# Patient Record
Sex: MALE
[De-identification: names, ages, dates, MRNs, and addresses within clinical notes are randomized per-mention and may not be internally consistent; named-entity substitution may affect disease eponyms.]

---

## 2020-03-18 NOTE — DIAGNOSTIC IMAGING REPORT
EXAMINATION:  CHEST 2 VIEWS    



INDICATION: Pain.



COMPARISON:  None

     

FINDINGS:

TUBES and LINES:  None.



LUNGS:  Lungs are well inflated.  Mild perihilar, peribronchial thickening and

perihilar streaky densities may reflect viral infection versus reactive airway

disease.   Bibasilar atelectasis.



PLEURA:  No pleural effusion or pneumothorax.



HEART AND MEDIASTINUM:  The cardiomediastinal silhouette is unremarkable.    



BONES AND SOFT TISSUES:  No acute osseous lesion.  Soft tissues are

unremarkable.



UPPER ABDOMEN: No free air under the diaphragm.    



IMPRESSION: 

Mild bilateral perihilar, peribronchial thickening. No focal consolidations.





Signed by: Dr. SABAS James M.D. on 3/18/2020 10:33 PM

## 2020-03-18 NOTE — DIAGNOSTIC IMAGING REPORT
EXAM: CT Abdomen and Pelvis WITH contrast  

INDICATION: Abdominal pain, nausea, vomiting diarrhea. Fever and chills.

COMPARISON: None.

TECHNIQUE: Abdomen and pelvis were scanned utilizing a multidetector helical

scanner from the lung base to the pubic symphysis after administration of IV

contrast. Coronal and sagittal reformations were obtained. Routine protocol was

performed. Scan was performed when during portal venous phase.

            IV CONTRAST: 100 cc Isovue-370

            ORAL CONTRAST: Water

            RADIATION DOSE: Total DLP: 718.03 mGy*cm

             Estimated effective dose: (DLP x 0.015 x size factor) mSv

            COMPLICATIONS: None



FINDINGS:



LINES and TUBES: None.



LOWER THORAX:  There is bibasilar atelectasis.



HEPATOBILIARY: The liver is diffuse hypodense compared to the spleen,

consistent with diffuse hepatic diffuse hepatic steatosis.  No focal hepatic

lesions. No biliary ductal dilation. 



GALLBLADDER: No radio-opaque stones or sludge.  No wall thickening.



SPLEEN: No splenomegaly. 



PANCREAS: No focal masses or ductal dilatation.  



ADRENALS: No adrenal nodules    



KIDNEYS/URETERS: Kidneys enhance symmetrically.  No hydronephrosis. No cystic

or solid mass lesions.  No stones.



GI TRACT: No abnormal distention, wall thickening, or evidence of bowel

obstruction.       Appendix is normal.



PELVIC ORGANS/BLADDER: Unremarkable.



LYMPH NODES: No lymphadenopathy.



VESSELS: There is mild atherosclerotic disease in the aorta and major arterial

branches.



PERITONEUM / RETROPERITONEUM: No free air or fluid.



BONES: Unremarkable.



SOFT TISSUES: Unremarkable.            



IMPRESSION: 

1.  No acute abdominal pelvic abnormality.

2.  Hepatic steatosis.



Signed by: Dr. SABAS James M.D. on 3/18/2020 10:52 PM

## 2020-03-18 NOTE — XMS REPORT
Patient Summary Document

                             Created on: 2020



MIRELLA COTTRELL

External Reference #: [id number]

: 1970

Sex: Male



Demographics







                          Address                   [place]  [address]

 

                          Home Phone                [phone]

 

                          Preferred Language        Unknown

 

                          Marital Status            Unknown

 

                          Denominational Affiliation     Unknown

 

                          Race                      Unknown

 

                                        Additional Race(s)  

 

                          Ethnic Group              Unknown





Author







                          Author                    [facility]

 

                          Address                   Unknown

 

                          Phone                     Unavailable







Care Team Providers







                    Care Team Member Name    Role                Phone

 

                    LAZARO JAQUEZ    Unavailable         Unavailable







Problems

This patient has no known problems.



Allergies, Adverse Reactions, Alerts

This patient has no known allergies or adverse reactions.



Medications

This patient has no known medications.



Results







           Test Description    Test Time    Test Comments    Text Results    Atomic Results    Result

 Comments

 

                CT ABDOMEN/PELVIS W    2020 22:42:00                                                       

                                                   [facility]                        [address] [place] [id number]      Patient Name: MIRELLA COTTRELL                                 
 MR #: [id number]                     : 1970                           
       Age/Sex: 49/M  Acct #: [id number]                              Req #: 
[id number]  Adm Physician:                                                      
Ordered by: TANA DAVIS NP                            Report #: [id number]  
     Location: ER                                      Room/Bed:                
    
___________________________________________________________________________________________________
   Procedure: [id number] CT/CT ABDOMEN/PELVIS W  Exam Date:                      
      Exam Time:                                               REPORT STATUS: 
Signed    EXAM: CT Abdomen and Pelvis WITH contrast     INDICATION: Abdominal 
pain, nausea, vomiting diarrhea. Fever and chills.   COMPARISON: None.   
TECHNIQUE: Abdomen and pelvis were scanned utilizing a multidetector helical   
scanner from the lung base to the pubic symphysis after administration of IV   
contrast. Coronal and sagittal reformations were obtained. Routine protocol was 
 performed. Scan was performed when during portal venous phase.               IV
CONTRAST: 100 cc Isovue-370               ORAL CONTRAST: Water               
RADIATION DOSE: Total DLP: 718.03 mGy*cm                Estimated effective 
dose: (DLP x 0.015 x size factor) mSv               COMPLICATIONS: None      
FINDINGS:      LINES and TUBES: None.      LOWER THORAX:  There is bibasilar 
atelectasis.      HEPATOBILIARY: The liver is diffuse hypodense compared to the 
spleen,   consistent with diffuse hepatic diffuse hepatic steatosis.  No focal 
hepatic   lesions. No biliary ductal dilation.       GALLBLADDER: No radio-
opaque stones or sludge.  No wall thickening.      SPLEEN: No splenomegaly.     
 PANCREAS: No focal masses or ductal dilatation.        ADRENALS: No adrenal 
nodules          KIDNEYS/URETERS: Kidneys enhance symmetrically.  No 
hydronephrosis. No cystic   or solid mass lesions.  No stones.      GI TRACT: No
abnormal distention, wall thickening, or evidence of bowel   obstruction.       
Appendix is normal.      PELVIC ORGANS/BLADDER: Unremarkable.      LYMPH NODES: 
No lymphadenopathy.      VESSELS: There is mild atherosclerotic disease in the 
aorta and major arterial   branches.      PERITONEUM / RETROPERITONEUM: No free 
air or fluid.      BONES: Unremarkable.      SOFT TISSUES: Unremarkable.        
         IMPRESSION:    1.  No acute abdominal pelvic abnormality.   2.  Hepatic
steatosis.      Signed by: Dr. SABAS Palomares M.D. on 3/18/2020 10:52 PM 
      Dictated By: BRAYAN PALOMARES MD, MD  Electronically Signed By: BRAYAN PALOMARES MD, MD on 20  Transcribed By: ELIZA on 20       
COPY TO:   TANA DAVIS NP              

 

                CHEST 2 VIEWS    2020 22:31:00                                                             

                                             [place] [address]  
   Patient Name: MIRELLA COTTRELL                                   MR #: 
[id number]                     : 1970                                  
Age/Sex: 49/M  Acct #: [id number]                              Req #: [id number]
[id number]  Adm Physician:                                                      
Ordered by: TANA DAVIS NP                            Report #: [id number]  
     Location: ER                                      Room/Bed:                
    
___________________________________________________________________________________________________
   Procedure: [id number] DX/CHEST 2 VIEWS  Exam Date: 20                   
        Exam Time:                                               REPORT 
STATUS: Signed    EXAMINATION:  CHEST 2 VIEWS          INDICATION: Pain.      
COMPARISON:  None           FINDINGS:   TUBES and LINES:  None.      LUNGS:  
Lungs are well inflated.  Mild perihilar, peribronchial thickening and   
perihilar streaky densities may reflect viral infection versus reactive airway  
disease.   Bibasilar atelectasis.      PLEURA:  No pleural effusion or 
pneumothorax.      HEART AND MEDIASTINUM:  The cardiomediastinal silhouette is 
unremarkable.          BONES AND SOFT TISSUES:  No acute osseous lesion.  Soft 
tissues are   unremarkable.      UPPER ABDOMEN: No free air under the diaphragm.
         IMPRESSION:    Mild bilateral perihilar, peribronchial thickening. No 
focal consolidations.         Signed by: Dr. SABAS Palomares M.D. on 
3/18/2020 10:33 PM        Dictated By: BRAYAN PALOMARES MD, MD  Electronically 
Signed By: BRAYAN PALOMARES MD, MD on 20  Transcribed By: ELIZA on 
20       COPY TO:   TANA DAVIS NP

## 2020-03-19 NOTE — NUR
PATIENT CAME FROM ER WITH WHEELCHAIR, AWAKE ALERT ORIENTED, STATED STARTED HAVING FEVER AND 
DIARRHEA YESTERDAY. VITALS CHECKED, NO DISTRESS NOTED AT THIS TIME, IV FLUID AND IV 
ANTIBIOTIC RUNNING AT THIS TIME. DENIED ANY PAIN OR DISCOMFORT. WILL CONTINUE TO MONITOR.

## 2020-03-19 NOTE — HISTORY AND PHYSICAL
The patient is in [facility].

 

CHIEF COMPLAINT:  The patient came in with high-grade fever of 102 and also nausea,

vomiting, and diarrhea. 

 

HISTORY OF PRESENT ILLNESS:  Mr. Roberto Tony is a 49-year-old gentleman with a

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

degrees and developed some abdominal pain, left lower quadrant, and the patient started

to nauseate and also had vomiting, went to the emergency room, was admitted to the

hospital.  The patient has ever since been under observation for ruling out COVID-19

too. 

 

PAST MEDICAL HISTORY:  Noncontributory.

 

SOCIAL HISTORY:  No EtOH.  No IV drug abuse.  No history of smoking.

 

FAMILY HISTORY:  Noncontributory.

 

REVIEW OF SYSTEMS:

Negative for chest pain.  Positive for some shortness of breath.  Positive nausea,

vomiting, or diarrhea.  No constipation.  No rectal bleeding.  No hematochezia.  No

hematemesis.  Possible myalgia.  Positive for high-grade fever.  No diplopia.  No blurry

vision either. 

 

PHYSICAL EXAMINATION:

VITAL SIGNS:  On arrival, temperature is 101.2, pulse of 135, respirations of 20-25,

blood pressure is 122/66, pulse oximetry of 95%. 

HEENT:  Normocephalic, atraumatic.  Pupils are reactive. 

CVS:  S1 and S2 normal.  Regular rhythm. 

LUNGS:  Positive for decreased air entry. 

ABDOMEN:  Tender in the left lower quadrant. 

EXTREMITIES:  No clubbing, no cyanosis, no edema.

LABORATORY VALUES:  White count initially was 13,000, hemoglobin of 16.6.  Today is 8.31

and 14.3.  Chemistry shows sodium 135 initially, potassium 3.5, glucose of 146, lactic

acid was 2.8.  ALT/AST normal.  Urine was negative.  Serology group A strep screen was

negative.  Influenza A and B were negative.  The patient's Coronavirus PCR is pending. 

 

ASSESSMENT:  Mr. Roberto Tony with,

1. Acute febrile illness.  Rule out Covid-19, negative for flu and strep. 

2. Abdominal pain with nausea and vomiting.

 

IMAGING STUDIES:  Showed no acute abdominal pelvic abnormality except for hepatic

steatosis and chest x-ray showed mild perihilar, peribronchial thickening.  No focal

consolidation. 

 

PLAN:  The patient has been given metronidazole and cefepime at this time.  White count

has been better and the patient is feeling better.  Also, on slow sodium chloride 150

mL/h, Zofran 4 mg every 6 hours has been given too.  Plan is to continue to monitor the

patient in the unit and the patient will be monitored and we will keep him in isolation

until Covid-19 PCR is back. 

Further recommendation and clinical course, we will continue to monitor the patient in

the isolation unit.  Further recommendation per clinical course.  An ID consult has been

done. 

 

 

 

 

______________________________

MD RONEL VillasenorJ/MODL

D:  03/19/2020 14:43:57

T:  03/19/2020 20:30:12

Job #:  [id number]

## 2020-03-19 NOTE — NUR
bedside rounds complete no distress noted, updated on poc vocied understanding,ivf infusing 
to r ac 20g no ss of infiltration noted, isolation precautions in place, denies pain at this 
time, call light in reach will continue to monitor

## 2020-03-19 NOTE — CONSULTATION
DATE OF CONSULTATION:  03/19/2020  

 

HISTORY OF PRESENT ILLNESS:  This patient who is a very pleasant 49-year-old Latin

American male.  The patient comes in with abdominal pain started for a couple of days,

vomiting, fever, diarrhea, had one episode of vomiting but the diarrhea was severe, came

to the emergency room.  There was no shortness of breath.  There was no cough.  No

travel.  No other antibiotic.  He denies any other past medical history. 

 

PAST MEDICAL HISTORY:  Hyperlipidemia.

 

PAST SURGICAL HISTORY:  Denies.

 

ALLERGIES:  NKA.

 

SOCIAL HISTORY:  There is no smoking, drug abuse, or alcohol abuse.  No travel.

 

FAMILY HISTORY:  Otherwise noncontributory.

 

REVIEW OF SYSTEMS:

At the present time when I saw him 

HEENT:  Negative. 

PULMONARY:  Negative. 

CARDIAC:  Negative. 

:  Negative.  He said there is no more diarrhea.  There is no more abdominal pain. 

 

All other systems within normal limits.

 

IMAGING DATA:  His laboratory reviewed.  CAT scan of abdomen and pelvis showed there is

no acute abdominal or pelvic findings.  He does have hepatic steatosis.  Chest x-ray,

mild bilateral thickening __________. 

 

LABORATORY DATA:  White count when he first came was 13.78, came down to 8.3, hemoglobin

16, hematocrit 49, platelet 239.  His sodium 138, potassium 3.5, and lactic acid 2.8.

His influenza A and B was negative. 

 

PHYSICAL EXAMINATION:

GENERAL:  He is currently alert, oriented, does not seem to be in acute distress.  His

vital stable afebrile, T-max 101.2. 

HEENT:  Not icteric. 

NECK:  Supple. 

CHEST:  Clear. 

HEART:  Soft, bowel sounds present.  No tenderness. 

EXTREMITIES:  No edema. 

SKIN:  No rash.

IMPRESSION:  I think the patient have gastroenteritis, bacteria versus viral.  He is

currently on cefepime and Flagyl.  Clinically doing better.  White count normalized.

Continue same.  If no 

fever for next 24 hours and his diarrhea is improving, can discharge home with Cipro and

Flagyl for 2 weeks.  Follow up as an outpatient. 

 

 

 

 

______________________________

MD JO Phillips/MODL

D:  03/19/2020 17:03:23

T:  03/19/2020 19:10:02

Job #:  [id number]

## 2020-03-19 NOTE — CONSULTATION
DATE OF CONSULTATION:  03/19/2020  

 

ADDENDUM:  There is concern if the patient is at risk for COVID-19.  He has no risk

factors.  Clinically, it seems like gastroenteritis.  The plan is to take him off

isolation, observe him for diarrhea.  The nurse tells me there is no more diarrhea.  If

he has no diarrhea, then can observe the patient on contact isolation. 

 

 

 

 

______________________________

MD JO Phillips/DAVE

D:  03/19/2020 17:04:37

T:  03/19/2020 17:27:48

Job #:  [id number]

## 2020-03-19 NOTE — NUR
dr grace at bedside pt does not have to be in isolation precautions, notified dr dowd 
awaiting call back

## 2020-03-19 NOTE — NUR
RECEIVED PATIENT IN BEDSIDE SHIFT REPORT. PATIENT RESTING IN BED AT THIS TIME, A&OX4. STATES 
HE HAS SOME "STOMACH GRUMBLING AND DISCOMFORT" BUT DOES NOT REQUEST PAIN MEDICATION AT THIS 
TIME. IV TO R AC 20G ASYMPTOMATIC, INTACT, AND PATENT. NO EDEMA NOTED. PEDAL PULSES 
PALPABLE. LUNG SOUNDS CLEAR. NO S&S OF DISTRESS NOTED. BED LOCKED IN LOWEST POSITION, SIDE 
RAILS UPX2, CALL LIGHT IN REACH.

## 2020-03-19 NOTE — NUR
report called to receiving nurse pt transferred to rm 200 pt left in stable condition with 
all belongings

## 2020-03-20 NOTE — NUR
RCD PT AT BED PT IS ALERT AND ORIENTED PT RESTING ON BED IV PATENT BY SALINE FLUSH BED LOW 
AND LOCKED CALL LIGHT IN REACH

## 2020-03-20 NOTE — PROGRESS NOTE
DATE:  03/20/2020  

 

SUBJECTIVE:  The patient did well overnight.  He states his abdominal pain is

significantly improved.  He has had no further diarrhea.  He has no nausea or vomiting.

He is able to eat well.  He still has some low-grade temperatures. 

 

OBJECTIVE:  VITAL SIGNS:  Temperature 99, blood pressure 102/70, pulse 74, saturation

98% on room air. 

GENERAL:  He is no apparent distress, lying in bed. 

CARDIOVASCULAR:  Regular rate and rhythm. 

LUNGS:  Clear to auscultation bilaterally. 

ABDOMEN:  Good bowel sounds.  Soft, nontender.  No peritoneal signs.  No distention. 

EXTREMITIES:  No clubbing or cyanosis. 

NEUROLOGIC:  Nonfocal.

 

ASSESSMENT/PLAN:  

1. Gastroenteritis.  Continue with current care since he is doing better.

2. Leukocytosis has resolved.

3. Diabetes.  Continue monitor.

4. Fever.  Continue with current care, most likely should improve today, continue the

use of 

antibiotics. 

Please see hospital chart for full details.

 

 

 

 

______________________________

MD NERISSA Pace/DAVE

D:  03/20/2020 04:41:38

T:  03/20/2020 05:06:16

Job #:  [id number]

## 2020-03-20 NOTE — NUR
AC TO DR MOORE PT CAN GO HOME IF OK WITH DR ALISON CHRISTINE AND NOTIFIED  DR YAP (COVERING 
DR ROSAS ) GOT THE DISCHARGE APPROVAL

## 2020-07-31 ENCOUNTER — HOSPITAL ENCOUNTER (OUTPATIENT)
Dept: HOSPITAL 88 - ER | Age: 50
Setting detail: OBSERVATION
LOS: 1 days | Discharge: HOME | End: 2020-08-01
Attending: FAMILY MEDICINE | Admitting: FAMILY MEDICINE
Payer: SELF-PAY

## 2020-07-31 VITALS — WEIGHT: 213 LBS | HEIGHT: 65 IN | BODY MASS INDEX: 35.49 KG/M2

## 2020-07-31 VITALS — DIASTOLIC BLOOD PRESSURE: 69 MMHG | SYSTOLIC BLOOD PRESSURE: 112 MMHG

## 2020-07-31 VITALS — SYSTOLIC BLOOD PRESSURE: 111 MMHG | DIASTOLIC BLOOD PRESSURE: 74 MMHG

## 2020-07-31 VITALS — SYSTOLIC BLOOD PRESSURE: 112 MMHG | DIASTOLIC BLOOD PRESSURE: 69 MMHG

## 2020-07-31 DIAGNOSIS — R51: ICD-10-CM

## 2020-07-31 DIAGNOSIS — Z11.59: ICD-10-CM

## 2020-07-31 DIAGNOSIS — I10: ICD-10-CM

## 2020-07-31 DIAGNOSIS — R50.9: ICD-10-CM

## 2020-07-31 DIAGNOSIS — D72.829: ICD-10-CM

## 2020-07-31 DIAGNOSIS — E78.5: ICD-10-CM

## 2020-07-31 DIAGNOSIS — E66.9: ICD-10-CM

## 2020-07-31 DIAGNOSIS — E11.65: Primary | ICD-10-CM

## 2020-07-31 LAB
ALBUMIN SERPL-MCNC: 3.7 G/DL (ref 3.5–5)
ALBUMIN/GLOB SERPL: 1 {RATIO} (ref 0.8–2)
ALP SERPL-CCNC: 80 IU/L (ref 40–150)
ALT SERPL-CCNC: 48 IU/L (ref 0–55)
ANION GAP SERPL CALC-SCNC: 12.9 MMOL/L (ref 8–16)
BACTERIA URNS QL MICRO: (no result) /HPF
BASOPHILS # BLD AUTO: 0.1 10*3/UL (ref 0–0.1)
BASOPHILS NFR BLD AUTO: 0.4 % (ref 0–1)
BILIRUB UR QL: NEGATIVE
BUN SERPL-MCNC: 10 MG/DL (ref 7–26)
BUN/CREAT SERPL: 10 (ref 6–25)
CALCIUM SERPL-MCNC: 8.6 MG/DL (ref 8.4–10.2)
CHLORIDE SERPL-SCNC: 101 MMOL/L (ref 98–107)
CK MB SERPL-MCNC: 1.1 NG/ML (ref 0–5)
CK SERPL-CCNC: 175 IU/L (ref 30–200)
CLARITY CSF: (no result)
CLARITY UR: CLEAR
CO2 SERPL-SCNC: 26 MMOL/L (ref 22–29)
COLOR CSF: (no result)
COLOR UR: YELLOW
DEPRECATED NEUTROPHILS # BLD AUTO: 15.7 10*3/UL (ref 2.1–6.9)
DEPRECATED RBC URNS MANUAL-ACNC: (no result) /HPF (ref 0–5)
EGFRCR SERPLBLD CKD-EPI 2021: > 60 ML/MIN (ref 60–?)
EOSINOPHIL # BLD AUTO: 0.1 10*3/UL (ref 0–0.4)
EOSINOPHIL NFR BLD AUTO: 0.5 % (ref 0–6)
EPI CELLS URNS QL MICRO: (no result) /LPF
ERYTHROCYTE [DISTWIDTH] IN CORD BLOOD: 12.6 % (ref 11.7–14.4)
GLOBULIN PLAS-MCNC: 3.6 G/DL (ref 2.3–3.5)
GLUCOSE SERPLBLD-MCNC: 174 MG/DL (ref 74–118)
HCT VFR BLD AUTO: 44.1 % (ref 38.2–49.6)
HGB BLD-MCNC: 15 G/DL (ref 14–18)
KETONES UR QL STRIP.AUTO: NEGATIVE
LEUKOCYTE ESTERASE UR QL STRIP.AUTO: NEGATIVE
LYMPHOCYTES # BLD: 2 10*3/UL (ref 1–3.2)
LYMPHOCYTES NFR BLD AUTO: 10.5 % (ref 18–39.1)
MCH RBC QN AUTO: 29.4 PG (ref 28–32)
MCHC RBC AUTO-ENTMCNC: 34 G/DL (ref 31–35)
MCV RBC AUTO: 86.3 FL (ref 81–99)
MONOCYTES # BLD AUTO: 1.3 10*3/UL (ref 0.2–0.8)
MONOCYTES NFR BLD AUTO: 6.6 % (ref 4.4–11.3)
NEUTS SEG NFR BLD AUTO: 81.3 % (ref 38.7–80)
NITRITE UR QL STRIP.AUTO: NEGATIVE
PLATELET # BLD AUTO: 204 X10E3/UL (ref 140–360)
POTASSIUM SERPL-SCNC: 3.9 MMOL/L (ref 3.5–5.1)
PROT UR QL STRIP.AUTO: NEGATIVE
RBC # BLD AUTO: 5.11 X10E6/UL (ref 4.3–5.7)
SODIUM SERPL-SCNC: 136 MMOL/L (ref 136–145)
SP GR UR STRIP: 1.02 (ref 1.01–1.02)
TUBE # CSF: 3
UROBILINOGEN UR STRIP-MCNC: 0.2 MG/DL (ref 0.2–1)
WBC #/AREA URNS HPF: (no result) /HPF (ref 0–5)

## 2020-07-31 PROCEDURE — 71045 X-RAY EXAM CHEST 1 VIEW: CPT

## 2020-07-31 PROCEDURE — 83690 ASSAY OF LIPASE: CPT

## 2020-07-31 PROCEDURE — 87070 CULTURE OTHR SPECIMN AEROBIC: CPT

## 2020-07-31 PROCEDURE — 99284 EMERGENCY DEPT VISIT MOD MDM: CPT

## 2020-07-31 PROCEDURE — 87476 LYME DIS DNA AMP PROBE: CPT

## 2020-07-31 PROCEDURE — 70450 CT HEAD/BRAIN W/O DYE: CPT

## 2020-07-31 PROCEDURE — 83605 ASSAY OF LACTIC ACID: CPT

## 2020-07-31 PROCEDURE — 86592 SYPHILIS TEST NON-TREP QUAL: CPT

## 2020-07-31 PROCEDURE — 86789 WEST NILE VIRUS ANTIBODY: CPT

## 2020-07-31 PROCEDURE — 83916 OLIGOCLONAL BANDS: CPT

## 2020-07-31 PROCEDURE — 82553 CREATINE MB FRACTION: CPT

## 2020-07-31 PROCEDURE — 82784 ASSAY IGA/IGD/IGG/IGM EACH: CPT

## 2020-07-31 PROCEDURE — 87040 BLOOD CULTURE FOR BACTERIA: CPT

## 2020-07-31 PROCEDURE — 82948 REAGENT STRIP/BLOOD GLUCOSE: CPT

## 2020-07-31 PROCEDURE — 84484 ASSAY OF TROPONIN QUANT: CPT

## 2020-07-31 PROCEDURE — 89051 BODY FLUID CELL COUNT: CPT

## 2020-07-31 PROCEDURE — 81001 URINALYSIS AUTO W/SCOPE: CPT

## 2020-07-31 PROCEDURE — 80048 BASIC METABOLIC PNL TOTAL CA: CPT

## 2020-07-31 PROCEDURE — 82040 ASSAY OF SERUM ALBUMIN: CPT

## 2020-07-31 PROCEDURE — 85025 COMPLETE CBC W/AUTO DIFF WBC: CPT

## 2020-07-31 PROCEDURE — 80053 COMPREHEN METABOLIC PANEL: CPT

## 2020-07-31 PROCEDURE — 82550 ASSAY OF CK (CPK): CPT

## 2020-07-31 PROCEDURE — 87205 SMEAR GRAM STAIN: CPT

## 2020-07-31 PROCEDURE — 36415 COLL VENOUS BLD VENIPUNCTURE: CPT

## 2020-07-31 PROCEDURE — 83036 HEMOGLOBIN GLYCOSYLATED A1C: CPT

## 2020-07-31 RX ADMIN — INSULIN HUMAN SCH UNIT: 100 INJECTION, SOLUTION PARENTERAL at 20:27

## 2020-07-31 NOTE — NUR
Pt received from ER at this time. Pt is aox3 and able to verbalize needs. 0 s/s of acute 
distress noted. Breaths are even and unlabored on room air.

## 2020-07-31 NOTE — NUR
Landy is here due to having a cough and headaches for the past 2 weeks. She states that she is also getting neck aches due to coughing so hard.    .[id number].[id number]    Pre-visit Screening:  Immunizations:  up to date  Colonoscopy:  is up to date  Mammogram: is due and to be scheduled by patient for later completion  Asthma Action Test/Plan:  No concerns  PHQ9:  No concerns  GAD7:  No concerns  Questioned patient about current smoking habits Pt. has never smoked.  Ok to leave detailed message on voice mail for today's visit only Yes, phone # [phone]           RECEIVED CALL FROM LAB COVID NEGATIVE RESULT . REPORTED COVID NEGATIVE RESULTS TO HOUSE 
SUPERVISOR.

## 2020-07-31 NOTE — XMS REPORT
Continuity of Care Document

                             Created on: 2020



MIRELLA COTTRELL

External Reference #: [id number]

: 1970

Sex: Male



Demographics





                          Address                   [address]

[place]  [address]

 

                          Home Phone                [phone]

 

                          Preferred Language        Unknown

 

                          Marital Status            Unknown

 

                          Scientology Affiliation     Unknown

 

                          Race                      Unknown

 

                                        Additional Race(s)  

 

                          Ethnic Group              Unknown





Author





                          Author                    [facility]

t

 

                          Organization              [facility]

 

                          Address                   [address]

[place]  [address]



 

                          Phone                     Unavailable







Care Team Providers





                    Care Team Member Name Role                Phone

 

                    ALONDRA ROSAS MD  PCP                 [phone]

 

                    ALONDRA ROSAS     Attphys             Unavailable

 

                    LAZARO JAQUEZ Attphys             Unavailable

 

                    ALONDRA ROSAS     Admphys             Unavailable







Payers





           Payer Name Policy Type Policy Number Effective Date Expiration Date ALONDRA diaz

 

           [employer] [place] [employer]            [id number] 2015 00:00:00     

       [facility]







Problems

This patient has no known problems.



Allergies, Adverse Reactions, Alerts

This patient has no known allergies or adverse reactions.



Medications





             Ordered Medication Name Filled Medication Name Start Date   Stop Da

te    Current 

Medication? Ordering Clinician Indication Dosage     Frequency  Signature (SIG) 

Comments                  Components                Source

 

                    Ciprofloxacin Hcl (Cipro) 500 Mg Tablet Ciprofloxacin Hcl (C

ipro) 500 Mg Tablet 

              Yes                  500           Every 12 Hours               [facility]

 

             Metronidazole (Flagyl) 250 Mg Tablet Metronidazole (Flagyl) 250 Mg 

Tablet                           

Yes                     500             Three Times A Day                 [facility]







Procedures





                Procedure       Date / Time Performed Performing Clinician Sour

e

 

                    Computed tomography of abdomen and pelvis with contrast 2020 00:00:00 

TANA DAVIS                          [facility]

 

                X-ray of chest, two views 2020 00:00:00 TANA DAVIS  [facility]







Encounters





             Start Date/Time End Date/Time Encounter Type Admission Type Attendi

[facility]   Care Department Encounter ID    Source

 

             2020 23:27:00 2020 13:03:00 Discharged Inpatient 1     

       INDIRA JAQUEZ         [facility]          [id number]    [facility]







Results





           Test Description Test Time  Test Comments Results    Result Comments 

Source

 

                CT BRAIN WO     2020 13:10:00                             

                                  

                                       [place] [address] [place] [address]      
Patient Name: MIRELLA COTTRELL JR                                MR #: 
[id number]                  : 1970                                   
Age/Sex: 49/M  Acct #: [id number]                              Req #: [id number]
[id number]  Adm Physician: STEVEN ROSAS MD                                    
Ordered by: JOHN VALDES DO                         Report #: [id number]    
   Location: [facility]                                  Room/Bed: [place] [address]        
 
________________________________________________________________________________

___________________    Procedure: [id number] CT/CT BRAIN WO  Exam Date: 20 
                          Exam Time: 1152                                       
      REPORT STATUS: Signed    Exam: Head CT without contrast   History:  
Headache, fever   Comparison studies:  None      Technique:   Axial images were 
obtained from the skull base to the vertex.   Coronal and sagittal images 
reconstructed from the axial data.  Dose   modulation, iterative reconstruction,
and/or weight based adjustment of the   mA/kV was utilized to reduce the 
radiation dose to as low as reasonably   achievable.        Radiation dose:    
Total DLP: 832.18 mGy*cm.    Estimated effective dose: DLP x 0.015    
Intravenous contrast: None      Findings:      Scalp: Scarring present in the 
right occipital scalp near the vertex.   Bones: No fractures, blastic or lytic 
lesions.      Brain sulci: Appropriate for age.   Ventricles: Normal in size and
configuration. No hydrocephalus.   Extra-axial spaces: No masses, no fluid 
collection.       Parenchyma:    No abnormal densities.    No masses, 
hemorrhage, acute or chronic vascular insults.      Sellar/suprasellar region: 
No abnormalities.   Craniocervical junction: Patent foramen magnum. No Chiari 
one malformation.      Incidental findings:   Partially opacified right mastoids
with sclerotic changes as sequela chronic   information.         IMPRESSION:    
  No acute abnormalities.      Signed by: Dr. Alexx Kuo M.D. on 2020 
1:14 PM        Dictated By: ALEXX KUO MD  Electronically Signed By: ALEXX KUO MD on 20 1314  Transcribed By: ELIZA on 20 1314       
COPY TO:   JOHN VALDES DO                                     

 

                CHEST SINGLE (PORTABLE) 2020 10:28:00                     

                              

                                                   [facility]                        [address] [address]      Patient Name: MIRELLA COTTRELL JR                           
    MR #: [id number]                  : 1970                           
       Age/Sex: 49/M  Acct #: [id number]                              Req #: 
[id number]  Adm Physician:                                                      
Ordered by: JOHN VALDES DO                         Report #: [id number]    
   Location: ER                                      Room/Bed:                  
 
________________________________________________________________________________

___________________    Procedure: [id number] DX/CHEST SINGLE (PORTABLE)  Exam 
Date:                             Exam Time:                                    
          REPORT STATUS: Signed    EXAMINATION:  CHEST SINGLE (PORTABLE)        
 INDICATION: Pneumonia, cough      COMPARISON: Chest radiograph 2008, CT 
abdomen and pelvis 3/18/2020           FINDINGS:      LINES/TUBES:None      
LUNGS:The lungs are well-inflated. No focal consolidation or pulmonary edema.   
  PLEURA:No pleural effusion or pneumothorax.      MEDIASTINUM:The 
cardiomediastinal silhouette appears normal in size and shape.      BONES/SOFT 
TISSUES:No acute osseous injury.      ABDOMEN:No free air under the diaphragm.  
      IMPRESSION:    No focal pneumonia or pulmonary edema.      Signed by: 
Leia Oliveira MD on 2020 10:29 AM        Dictated By: LEIA OLIVEIRA MD  
Electronically Signed By: LEIA OLIVEIRA MD on 20 1029  Transcribed By: 
ELIZA on 20 1029       COPY TO:   JOHN VALDES DO                   

                  

 

                    Blood Culture       2020 20:42:00   

 

                                        Test Item

 

             Blood Culture (test code = 82792889) NO GROWTH AFTER 24 HOURS      

                      





[facility] Isktcnan2819-69-39 08:32:00* 



             Test Item    Value        Reference Range Interpretation Comments

 

             Blood Urea Nitrogen (test code = 3094-0) 10           7-26         

              





[facility]UN/Creatinine Ultao5714-02-29 08:32:00* 



             Test Item    Value        Reference Range Interpretation Comments

 

             BUN/Creatinine Ratio (test code = 3097-3) 12           6-        

               





[facility] Ps[facility]v6082[facility] 06:43:00* 



             Test Item    Value        Reference Range Interpretation Comments

 

             Sodium Level (test code = 2951-2) 138          136-145             

       





[facility]lake[facility]assium Rgspm2355-67-59 06:43:00* 



             Test Item    Value        Reference Range Interpretation Comments

 

             Potassium Level (test code = 2823-3) 3.5          3.5-5.1          

          





[facility]lakeChloride Hjrwh0221-78-20 06:43:00* 



             Test Item    Value        Reference Range Interpretation Comments

 

             Chloride Level (test code = 2075-0) 110                 H    

         





[facility]lakeCarbon Dioxide Anukh8598-36-91 06:43:00* 



             Test Item    Value        Reference Range Interpretation Comments

 

             Carbon Dioxide Level (test code = 2028-9) 23           22-29       

               





[facility]lakeAnion Gga4216-32-90 06:43:00* 



             Test Item    Value        Reference Range Interpretation Comments

 

             Anion Gap (test code = 33037-3) 8.5          8-16                  

     





[facility]lakeCreatinine2020-03-19 06:43:00* 



             Test Item    Value        Reference Range Interpretation Comments

 

             Creatinine (test code = 2160-0) 0.82         0.72-1.25             

     





[facility]lakeEstimat Glomerular Filtration Rate
2020 06:43:00* 



             Test Item    Value        Reference Range Interpretation Comments

 

             Estimat Glomerular Filtration Rate (test code = 304426967) > 60    

     >60                        





Ranges were taken from the National Kidney Disease Education Program and the [facility] Hospital[facility] Kidney Foundation literature.Reference ranges:60 or greater: Vclviq50-46 (
for 3 consecutive months): Chronic kidney disease 15 or less: Kidney failureTexas Health Harris Methodist Hospital SouthlakeGlucose Omrtm5317-45-77 06:43:00* 



             Test Item    Value        Reference Range Interpretation Comments

 

             Glucose Level (test code = QVR0697) 124                 H    

         





[facility]lakeCalcium Ywsit9844-77-66 06:43:00* 



             Test Item    Value        Reference Range Interpretation Comments

 

             Calcium Level (test code = 54368-6) 7.9          8.4-10.2     L    

         





[facility]lakeTotal Oadkxgpku5916-81-61 06:43:00* 



             Test Item    Value        Reference Range Interpretation Comments

 

             Total Bilirubin (test code = 1975-2) 0.4          0.2-1.2          

          





[facility]lakeAspartate Amino Transf (AST/SGOT)
2020 06:43:00* 



             Test Item    Value        Reference Range Interpretation Comments

 

                                        Aspartate Amino Transf (AST/SGOT) (test 

code = Aspartate Amino Transf 

(AST/SGOT))     15              5-34                             





[facility]lakeAlanine Aminotransferase (ALT/SGPT)
2020 06:43:00* 



             Test Item    Value        Reference Range Interpretation Comments

 

             Alanine Aminotransferase (ALT/SGPT) (test code = 1742-6) 30        

   0-55                       





[facility]lakeTotal Dpgblsz7132-88-60 06:43:00* 



             Test Item    Value        Reference Range Interpretation Comments

 

             Total Protein (test code = 2885-2) 6.3          6.5-8.1      L     

        





[facility] SouthlakeAlbumin2020-03-19 06:43:00* 



             Test Item    Value        Reference Range Interpretation Comments

 

             Albumin (test code = 1751-7) 3.0          3.5-5.0      L           

  





[facility]lakeGlobulin2020-03-19 06:43:00* 



             Test Item    Value        Reference Range Interpretation Comments

 

             Globulin (test code = 73591-9) 3.3          2.3-3.5                

    





[facility] SouthlakeAlbumin/Globulin Dsits2556-33-07 06:43:00
  * 



             Test Item    Value        Reference Range Interpretation Comments

 

             Albumin/Globulin Ratio (test code = 1759-0) 0.9          0.8-2.0   

                 





[facility] SouthlakeAlkaline Qwxcfmknqsd7572-47-65 06:43:00* 



             Test Item    Value        Reference Range Interpretation Comments

 

             Alkaline Phosphatase (test code = 6768-6) 61                 

               





[facility] SouthlakeWhite Blood Ioeck0371-95-92 06:26:00* 



             Test Item    Value        Reference Range Interpretation Comments

 

             White Blood Count (test code = 6690-2) 8.31         4.8-10.8       

            





[facility] SouthlakeRed Blood Iboom3879-42-33 06:26:00* 



             Test Item    Value        Reference Range Interpretation Comments

 

             Red Blood Count (test code = 789-8) 4.82         4.3-5.7           

         





[facility]lakeHemoglobin2020-03-19 06:26:00* 



             Test Item    Value        Reference Range Interpretation Comments

 

             Hemoglobin (test code = 50604-0) 14.3         14.0-18.0            

      





[facility]lakeHematocrit2020-03-19 06:26:00* 



             Test Item    Value        Reference Range Interpretation Comments

 

             Hematocrit (test code = 4544-3) 42.5         38.2-49.6             

     





[facility] SouthlakeMean Corpuscular Lvziqs3707-87-59 
06:26:00* 



             Test Item    Value        Reference Range Interpretation Comments

 

             Mean Corpuscular Volume (test code = 787-2) 88.2         81-99     

                 





[facility] SouthlakeMean Corpuscular Ngwonaazhn9436-98-05 
06:26:00* 



             Test Item    Value        Reference Range Interpretation Comments

 

             Mean Corpuscular Hemoglobin (test code = 785-6) 29.7         28-32 

                     





[facility] Hospital SouthlakeMean Corpuscular Hemoglobin Concent
2020 06:26:00* 



             Test Item    Value        Reference Range Interpretation Comments

 

             Mean Corpuscular Hemoglobin Concent (test code = 786-4) 33.6       

  31-35                      





[facility] Hospital SouthlakeRed Cell Distribution Eokph2377-07-25 
06:26:00* 



             Test Item    Value        Reference Range Interpretation Comments

 

             Red Cell Distribution Width (test code = 36962-2) 13.1         11.7

-14.4                  





[facility] SouthlakePlatelet Yzwpq8225-58-77 06:26:00* 



             Test Item    Value        Reference Range Interpretation Comments

 

             Platelet Count (test code = 777-3) 188          140-360            

        





[facility] SouthlakeNeutrophils (%) (Auto)2020 06:26:00
  * 



             Test Item    Value        Reference Range Interpretation Comments

 

             Neutrophils (%) (Auto) (test code = 40370-2) 74.4         38.7-80.0

                  





[facility] SouthlakeLymphocytes (%) (Auto)2020 06:26:00
  * 



             Test Item    Value        Reference Range Interpretation Comments

 

             Lymphocytes (%) (Auto) (test code = 736-9) 14.9         18.0-39.1  

  L             





[facility]lakeMonocytes (%) (Auto)2020 06:26:00* 



             Test Item    Value        Reference Range Interpretation Comments

 

             Monocytes (%) (Auto) (test code = 5905-5) 8.9          4.4-11.3    

               





[facility]lakeEosinophils (%) (Auto)2020 06:26:00
  * 



             Test Item    Value        Reference Range Interpretation Comments

 

             Eosinophils (%) (Auto) (test code = 713-8) 1.2          0.0-6.0    

                





[facility]lakeBasophils (%) (Auto)2020 06:26:00* 



             Test Item    Value        Reference Range Interpretation Comments

 

             Basophils (%) (Auto) (test code = 706-2) 0.2          0.0-1.0      

              





[facility]lakeIM GRANULOCYTES %2020 06:26:00* 



             Test Item    Value        Reference Range Interpretation Comments

 

             IM GRANULOCYTES % (test code = IM GRANULOCYTES %) 0.4          0.0-

1.0                    





[facility]lakeNeutrophils # (Auto)2020 06:26:00* 



             Test Item    Value        Reference Range Interpretation Comments

 

             Neutrophils # (Auto) (test code = 751-8) 6.2          2.1-6.9      

              





[facility]lakeLymphocytes # (Auto)2020 06:26:00* 



             Test Item    Value        Reference Range Interpretation Comments

 

             Lymphocytes # (Auto) (test code = 73301-8) 1.2          1.0-3.2    

                





[facility]lakeMonocytes # (Auto)2020 06:26:00* 



             Test Item    Value        Reference Range Interpretation Comments

 

             Monocytes # (Auto) (test code = 742-7) 0.7          0.2-0.8        

            





[facility]lakeEosinophils # (Auto)2020 06:26:00* 



             Test Item    Value        Reference Range Interpretation Comments

 

             Eosinophils # (Auto) (test code = 711-2) 0.1          0.0-0.4      

              





[facility]lakeBasophils # (Auto)2020 06:26:00* 



             Test Item    Value        Reference Range Interpretation Comments

 

             Basophils # (Auto) (test code = 704-7) 0.0          0.0-0.1        

            





[facility]lakeAbsolute Immature Granulocyte (auto
2020 06:26:00* 



             Test Item    Value        Reference Range Interpretation Comments

 

                                        Absolute Immature Granulocyte (auto (aileen

t code = Absolute Immature Granulocyte 

(auto)          0.03            0-0.1                            





[facility]lakeCT [facility] -05-37 22:42:00   
                                                                                
 [place] [address]      Patient Name: MIRELLA COTTRELL         
                         MR #: [id number]                     : 1970   
                               Age/Sex: 49/M  Acct #: [id number]              
               Req #: [id number]  Adm Physician:                                
                     Ordered by: TANA DAVIS NP                            
Report #: [id number]        Location: ER                                      
Room/Bed:                     ____________________________________________
_______________________________________________________    Procedure: [id number] 
CT/CT ABDOMEN/PELVIS W  Exam Date:                             Exam Time:       
                                       REPORT STATUS: Signed    EXAM: CT Abdomen
and Pelvis WITH contrast     INDICATION: Abdominal pain, nausea, vomiting diar
laura. Fever and chills.   COMPARISON: None.   TECHNIQUE: Abdomen and pelvis were
scanned utilizing a multidetector helical   scanner from the lung base to the p
ubic symphysis after administration of IV   contrast. Coronal and sagittal refor
mations were obtained. Routine protocol was   performed. Scan was performed when
during portal venous phase.               IV CONTRAST: 100 cc Isovue-370        
      ORAL CONTRAST: Water               RADIATION DOSE: Total DLP: 718.03 mGy
*cm                Estimated effective dose: (DLP x 0.015 x size factor) mSv    
          COMPLICATIONS: None      FINDINGS:      LINES and TUBES: None.      L
OWER THORAX:  There is bibasilar atelectasis.      HEPATOBILIARY: The liver is d
iffuse hypodense compared to the spleen,   consistent with diffuse hepatic diffu
se hepatic steatosis.  No focal hepatic   lesions. No biliary ductal dilation.  
    GALLBLADDER: No radio-opaque stones or sludge.  No wall thickening.      SP
DARREN: No splenomegaly.       PANCREAS: No focal masses or ductal dilatation.    
   ADRENALS: No adrenal nodules          KIDNEYS/URETERS: Kidneys enhance symme
trically.  No hydronephrosis. No cystic   or solid mass lesions.  No stones.    
 GI TRACT: No abnormal distention, wall thickening, or evidence of bowel   obst
ruction.       Appendix is normal.      PELVIC ORGANS/BLADDER: Unremarkable.    
 LYMPH NODES: No lymphadenopathy.      VESSELS: There is mild atherosclerotic d
isease in the aorta and major arterial   branches.      PERITONEUM / RETROPERITO
NEUM: No free air or fluid.      BONES: Unremarkable.      SOFT TISSUES: Unremar
kable.                  IMPRESSION:    1.  No acute abdominal pelvic abnormality
.   2.  Hepatic steatosis.      Signed by: Dr. SABAS Palomares M.D. on 3/1
2020 10:52 PM        Dictated By: BRAYAN PALOMARES MD, MD  Electronically Signed 
By: BRAYAN PALOMARES MD, MD on 20 7544  Transcribed By: ELIZA on 20 2
252       COPY TO:   TANA DAVIS NP         CHEST 2 ADRVL5561-15-96 22:31:00  
                                                                                
  [place] [address]      Patient Name: MIRELLA COTTRELL         
                         MR #: [id number]                     : 1970   
                               Age/Sex: 49/M  Acct #: [id number]              
               Req #: [id number]  Adm Physician:                                
                     Ordered by: CACACE, TANA J NP                            
Report #: [id number]        Location:                                       
Room/Bed:                     ____________________________________________
_______________________________________________________    Procedure: [id number] 
DX/CHEST 2 VIEWS  Exam Date: 20                            Exam Time: 
                                             REPORT STATUS: Signed    EXAMINATI
ON:  CHEST 2 VIEWS          INDICATION: Pain.      COMPARISON:  None           F
INDINGS:   TUBES and LINES:  None.      LUNGS:  Lungs are well inflated.  Mild p
erihilar, peribronchial thickening and   perihilar streaky densities may reflect
viral infection versus reactive airway   disease.   Bibasilar atelectasis.      
PLEURA:  No pleural effusion or pneumothorax.      HEART AND MEDIASTINUM:  The 
cardiomediastinal silhouette is unremarkable.          BONES AND SOFT TISSUES:  
No acute osseous lesion.  Soft tissues are   unremarkable.      UPPER ABDOMEN: N
o free air under the diaphragm.          IMPRESSION:    Mild bilateral perihilar
, peribronchial thickening. No focal consolidations.         Signed by: Dr. SABAS Palomares M.D. on 3/18/2020 10:33 PM        Dictated By: BRAYAN PALOMARES MD, MD  Electronically Signed By: BRAYAN PALOMARES MD, MD on 20  Transcri
bed By: ELIZA on 20       COPY TO:   TANA DAVIS NP         
Lactic Acid Aosrp4429-33-15 21:57:00* 



             Test Item    Value        Reference Range Interpretation Comments

 

             Lactic Acid Level (test code = Lactic Acid Level) 2.0          0.5-

2.0                    





[facility]
2020 21:09:00* 



             Test Item    Value        Reference Range Interpretation Comments

 

             Influenza Virus Types A,B Antigen (test code = 70409-1) NEGATIVE   

  NEGATIVE                   





[facility] 21:08:00* 



             Test Item    Value        Reference Range Interpretation Comments

 

             Lipase (test code = 3040-3) 18           8-                      

 





[facility]lakeGroup A Streptococcus [id number] 
21:05:00* 



             Test Item    Value        Reference Range Interpretation Comments

 

             Group A Streptococcus Screen (test code = 35178-8) NEGATIVE     NEG

ATIVE                   





[facility] [id number] 21:03:00* 



             Test Item    Value        Reference Range Interpretation Comments

 

             Urine WBC (test code = 5821-4) NONE         0-5                    

    





[facility] [id number] 21:03:00* 



             Test Item    Value        Reference Range Interpretation Comments

 

             Urine RBC (test code = 50120-6) NONE         0-5                   

     





[facility] [id number] 21:03:00* 



             Test Item    Value        Reference Range Interpretation Comments

 

             Urine Bacteria (test code = 71418-1) FEW          NONE             

          





[facility] Epithelial [id number] 21:03:00
  * 



             Test Item    Value        Reference Range Interpretation Comments

 

             Urine Epithelial Cells (test code = 92153-8) NONE         NONE     

                  





[facility] [id number] 20:52:00* 



             Test Item    Value        Reference Range Interpretation Comments

 

             Urine Color (test code = 5778-6) YELLOW       YELLOW               

      





[facility] [id number] 20:52:00* 



             Test Item    Value        Reference Range Interpretation Comments

 

             Urine Clarity (test code = 46640-8) SL CLOUDY    CLEAR        H    

         





[facility] Specific [id number] 20:52:00
  * 



             Test Item    Value        Reference Range Interpretation Comments

 

             Urine Specific Gravity (test code = 5811-5) 1.030        1.010-1.02

5  H             





[facility] [id number] 20:52:00* 



             Test Item    Value        Reference Range Interpretation Comments

 

             Urine pH (test code = 64597-6) 5.5          5-7                    

    





[facility] Leukocyte [id number] 
20:52:00* 



             Test Item    Value        Reference Range Interpretation Comments

 

             Urine Leukocyte Esterase (test code = 5799-2) NEGATIVE     NEGATIVE

                   





[facility] [id number] 20:52:00* 



             Test Item    Value        Reference Range Interpretation Comments

 

             Urine Nitrite (test code = 02610-2) NEGATIVE     NEGATIVE          

         





[facility] [id number] 20:52:00* 



             Test Item    Value        Reference Range Interpretation Comments

 

             Urine Protein (test code = 5804-0) NEGATIVE     NEGATIVE           

        





[facility] Glucose (UA)2020 20:52:00* 



             Test Item    Value        Reference Range Interpretation Comments

 

             Urine Glucose (UA) (test code = 2349-9) NEGATIVE     NEGATIVE      

             





[facility] [id number] 20:52:00* 



             Test Item    Value        Reference Range Interpretation Comments

 

             Urine Ketones (test code = 50612-1) NEGATIVE     NEGATIVE          

         





[facility] [id number] 20:52:00* 



             Test Item    Value        Reference Range Interpretation Comments

 

             Urine Urobilinogen (test code = 58609-2) 0.2          0.2-1        

              





[facility] [id number] 20:52:00* 



             Test Item    Value        Reference Range Interpretation Comments

 

             Urine Bilirubin (test code = 1978-6) NEGATIVE     NEGATIVE         

          





[facility] [id number] 20:52:00* 



             Test Item    Value        Reference Range Interpretation Comments

 

             Urine Blood (test code = 34525-9) NEGATIVE     NEGATIVE            

       





[facility]

## 2020-07-31 NOTE — NUR
PROVIDED EDUCATION REGARDING S/S OF LOW BLOOD SUGAR AT BEDSIDE. INSTRUCTED TO NOTIFY IF S/S 
APPEAR. VERBALIZES UNDERSTANDING.

## 2020-07-31 NOTE — HISTORY AND PHYSICAL
The patient came in with generalized malaise, weakness, fatigue, and dizziness.

 

HISTORY OF PRESENT ILLNESS:  Mr. Chavo Mejia, who was in usual state of health,

__________ on my way to the office when he developed __________ fever.  The patient

complained of generalized fatigue and was sent to the emergency room.  The patient

arrived and had an LP done for fever and elevated white count.  The patient is admitted

to the hospital. 

 

PAST MEDICAL HISTORY:  History of hypertension, history of prediabetes, otherwise

noncontributory. 

 

MEDICATIONS:  He takes none.

 

ALLERGIES:  NO CLEAR ALLERGIES.

 

SOCIAL HISTORY:  No EtOH, no IV drug abuse, positive for smoking.

 

PHYSICAL EXAMINATION:

VITAL SIGNS:  Temperature is 100.6, pulse of 122, respirations of 19, blood pressure is

130/84. 

HEENT:  Normocephalic and atraumatic.  The patient had a poor oropharynx with poor

dental hygiene. 

CVS:  S1 and S2 normal.  Regular rhythm. 

LUNGS:  Clear to auscultation. 

ABDOMEN:  Soft, nontender, nondistended. 

EXTREMITIES:  No clubbing, no cyanosis, no edema.

LABORATORY DATA:  The patient's lactic acid was 1.2.  Urine essentially negative.  White

count is 19,000, neutrophil count 81,000. 

 

The patient also had lumbar puncture done, which was normal.

 

ASSESSMENT:  A 49-year-old gentleman with elevated white count and elevated blood

sugars, admitted for possible viral syndrome.  LP was negative.  The patient has been

started on broad-spectrum antibiotics, has been kept on insulin. 

 

PLAN:  Plan is to continue to monitor the patient.  Consult with Dr. Alberto has been

done.  Possible viral in nature.  We will check his A1c.  Check of labs in the morning

and possible discharge in the morning.  Further recommendation per clinical course. 

 

 

 

 

______________________________

Josh Ferraro MD

 

ASJ/MODL

D:  07/31/2020 18:23:45

T:  07/31/2020 19:23:00

Job #:  [id number]

## 2020-07-31 NOTE — DIAGNOSTIC IMAGING REPORT
Exam: Head CT without contrast

History:  Headache, fever

Comparison studies:  None



Technique:

Axial images were obtained from the skull base to the vertex.

Coronal and sagittal images reconstructed from the axial data.  Dose

modulation, iterative reconstruction, and/or weight based adjustment of the

mA/kV was utilized to reduce the radiation dose to as low as reasonably

achievable.  



Radiation dose: 

Total DLP: 832.18 mGy*cm. 

Estimated effective dose: DLP x 0.015 

Intravenous contrast: None



Findings:



Scalp: Scarring present in the right occipital scalp near the vertex.

Bones: No fractures, blastic or lytic lesions.



Brain sulci: Appropriate for age.

Ventricles: Normal in size and configuration. No hydrocephalus.

Extra-axial spaces: No masses, no fluid collection. 



Parenchyma: 

No abnormal densities. 

No masses, hemorrhage, acute or chronic vascular insults.



Sellar/suprasellar region: No abnormalities.

Craniocervical junction: Patent foramen magnum. No Chiari one malformation.



Incidental findings:

Partially opacified right mastoids with sclerotic changes as sequela chronic

information.





IMPRESSION:

 

No acute abnormalities.



Signed by: Dr. Chris Kuo M.D. on 7/31/2020 1:14 PM

## 2020-07-31 NOTE — DIAGNOSTIC IMAGING REPORT
EXAMINATION:  CHEST SINGLE (PORTABLE)    



INDICATION: Pneumonia, cough



COMPARISON: Chest radiograph 8/2/2008, CT abdomen and pelvis 3/18/2020

     

FINDINGS:



LINES/TUBES:None



LUNGS:The lungs are well-inflated. No focal consolidation or pulmonary edema.



PLEURA:No pleural effusion or pneumothorax.



MEDIASTINUM:The cardiomediastinal silhouette appears normal in size and shape.



BONES/SOFT TISSUES:No acute osseous injury.



ABDOMEN:No free air under the diaphragm.





IMPRESSION: 

No focal pneumonia or pulmonary edema.



Signed by: Phillip Merrill MD on 7/31/2020 10:29 AM

## 2020-08-01 VITALS — DIASTOLIC BLOOD PRESSURE: 84 MMHG | SYSTOLIC BLOOD PRESSURE: 118 MMHG

## 2020-08-01 VITALS — DIASTOLIC BLOOD PRESSURE: 71 MMHG | SYSTOLIC BLOOD PRESSURE: 107 MMHG

## 2020-08-01 VITALS — SYSTOLIC BLOOD PRESSURE: 118 MMHG | DIASTOLIC BLOOD PRESSURE: 84 MMHG

## 2020-08-01 VITALS — DIASTOLIC BLOOD PRESSURE: 69 MMHG | SYSTOLIC BLOOD PRESSURE: 113 MMHG

## 2020-08-01 LAB
ANION GAP SERPL CALC-SCNC: 10.8 MMOL/L (ref 8–16)
BASOPHILS # BLD AUTO: 0.1 10*3/UL (ref 0–0.1)
BASOPHILS NFR BLD AUTO: 0.6 % (ref 0–1)
BUN SERPL-MCNC: 9 MG/DL (ref 7–26)
BUN/CREAT SERPL: 11 (ref 6–25)
CALCIUM SERPL-MCNC: 8.2 MG/DL (ref 8.4–10.2)
CHLORIDE SERPL-SCNC: 109 MMOL/L (ref 98–107)
CO2 SERPL-SCNC: 23 MMOL/L (ref 22–29)
DEPRECATED NEUTROPHILS # BLD AUTO: 5 10*3/UL (ref 2.1–6.9)
EGFRCR SERPLBLD CKD-EPI 2021: > 60 ML/MIN (ref 60–?)
EOSINOPHIL # BLD AUTO: 0.2 10*3/UL (ref 0–0.4)
EOSINOPHIL NFR BLD AUTO: 2.4 % (ref 0–6)
ERYTHROCYTE [DISTWIDTH] IN CORD BLOOD: 12.9 % (ref 11.7–14.4)
GLUCOSE SERPLBLD-MCNC: 132 MG/DL (ref 74–118)
HCT VFR BLD AUTO: 40.4 % (ref 38.2–49.6)
HGB BLD-MCNC: 14.1 G/DL (ref 14–18)
LYMPHOCYTES # BLD: 3 10*3/UL (ref 1–3.2)
LYMPHOCYTES NFR BLD AUTO: 31.8 % (ref 18–39.1)
MCH RBC QN AUTO: 31.3 PG (ref 28–32)
MCHC RBC AUTO-ENTMCNC: 34.9 G/DL (ref 31–35)
MCV RBC AUTO: 89.6 FL (ref 81–99)
MONOCYTES # BLD AUTO: 1.2 10*3/UL (ref 0.2–0.8)
MONOCYTES NFR BLD AUTO: 12.1 % (ref 4.4–11.3)
NEUTS SEG NFR BLD AUTO: 52.6 % (ref 38.7–80)
PLATELET # BLD AUTO: 155 X10E3/UL (ref 140–360)
POTASSIUM SERPL-SCNC: 3.8 MMOL/L (ref 3.5–5.1)
RBC # BLD AUTO: 4.51 X10E6/UL (ref 4.3–5.7)
SODIUM SERPL-SCNC: 139 MMOL/L (ref 136–145)

## 2020-08-01 RX ADMIN — INSULIN HUMAN SCH UNIT: 100 INJECTION, SOLUTION PARENTERAL at 08:57

## 2020-08-01 NOTE — CONSULTATION
DATE OF CONSULTATION:    

 

HISTORY OF PRESENT ILLNESS:  Mr. Tony is a very pleasant 49-year-old gentleman who was

admitted on 07/31/2020.  I did see him on 07/31/2020 with fever, chills, weakness, not

feeling well, and headache.  The patient has history of hypertension and early diabetes.

 He was in good health until he was driving home when he felt really bad, came to the

emergency room.  He had some headache and not feeling well.  He had a temperature 100.6,

heart rate was 122.  The patient was admitted.  LP was done.  I was asked to see him.

The patient when I saw him, he said he is feeling much better.  He has no complaints. 

 

The patient since admission had no more fever.

 

REVIEW OF SYSTEMS:

When I saw him 

HEENT:  Negative. 

PULMONARY:  Negative. 

CARDIAC:  Negative. 

:  Negative. 

GI:  Negative. 

SKIN:  There is no other rash.  I did discuss the case with his attending, Dr. Ferraro.

 

LABORATORY DATA:  Reviewed.  His COVID-19 is negative.  His sodium 139, potassium 3.6,

creatinine 0.85.  His spinal fluid, WBC of 5, RBC of 11,573. 

 

PHYSICAL EXAMINATION:

GENERAL:  He is currently alert, oriented. 

VITALS:  Stable, currently afebrile. 

HEENT:  Not icteric. 

NECK:  Supple. 

CHEST:  Clear. 

HEART:  S1 and S2.  No S3, S4, or murmur. 

ABDOMEN:  Soft.  Bowel sounds present. 

EXTREMITIES:  Muscular rash.

IMPRESSION:  Fever and headache, probably viral.  Discussed with attending.  Reviewed

all his lab.  I will review his labs again tonight.  We will visit again in the morning.

 If all workup is negative, could be discharged home. 

 

 

 

 

______________________________

MD JO Phillips/MODL

D:  08/01/2020 16:51:39

T:  08/01/2020 17:49:55

Job #:  [id number]

## 2020-08-01 NOTE — NUR
C/O SWEATY AND FEELING HOT. CHECKED VITALS WITHIN NORMAL LIMITS. BLOOD SUGAR 191. WILL 
MONITOR PATIENT.

## 2020-08-01 NOTE — NUR
REPORT GIVEN TO DAY SHIFT NURSE. ALERT AND RESTING IN BED. NO SIGNS IV INFILTRATION. BED 
LOCKED AND IN LOW POSITION. CALL LIGHT WITHIN REACH. BED ALARM ACTIVATED.

## 2020-08-01 NOTE — NUR
Mr. Chavo Mejia, who was in usual state of health,

 on my way to the office when he developed  fever.  The patient

complained of generalized fatigue and was sent to the emergency room.  The 
patient

arrived and had an LP done for fever and elevated white count.  The patient is 
admitted

to the hospital. 

 

PAST MEDICAL HISTORY:  History of hypertension, history of prediabetes, 
otherwise

noncontributory. 

 

MEDICATIONS:  He takes none.

 

ALLERGIES:  NO CLEAR ALLERGIES.

 

SOCIAL HISTORY:  No EtOH, no IV drug abuse, positive for smoking.

 

PHYSICAL EXAMINATION:

VITAL SIGNS:  Temperature is 100.6, pulse of 122, respirations of 19, blood 
pressure is

130/84. 

HEENT:  Normocephalic and atraumatic.  The patient had a poor oropharynx with 
poor

dental hygiene. 

CVS:  S1 and S2 normal.  Regular rhythm. 

LUNGS:  Clear to auscultation. 

ABDOMEN:  Soft, nontender, nondistended. 

EXTREMITIES:  No clubbing, no cyanosis, no edema.

LABORATORY DATA:  The patient's lactic acid was 1.2.  Urine essentially 
negative.  White

count is 19,000, neutrophil count 81,000. 

 

The patient also had lumbar puncture done, which was normal.

 

ASSESSMENT:  A 49-year-old gentleman with elevated white count and elevated 
blood

sugars, admitted for possible viral syndrome.  LP was negative.  The patient 
has been

started on broad-spectrum antibiotics, has been kept on insulin. 

 [id number]

## 2020-08-01 NOTE — PROGRESS NOTE
DATE:    

 

SUBJECTIVE:  The patient is a 49-year-old gentleman that came in with possible acute

sepsis, has been ruled out.  LP was done yesterday.  The patient's LP was essentially

normal.  Gram stain shows no WBCs and no organisms seen.  Laboratory values, corona

virus is still pending and has been done Lyme titer, which is pending.  CSF was bloody,

CSF color was red.  It was a traumatic puncture.  CSF otherwise was essentially within

normal limits.  Today, the patient is feeling well.  No cough, no congestion.  Vital

signs except for headache from his LP. 

 

OBJECTIVE:  VITAL SIGNS:  Temperature is 98.7, pulse 73, respirations 17, blood pressure

is 118/84, pulse oximeter 97%. 

HEENT:  Normocephalic and atraumatic.  Pupils are reactive to light and accommodation. 

CVS:  S1 and S2 normal.  Regular rate and rhythm. ABDOMEN:  Nontender, nondistended. 

EXTREMITIES:  No clubbing, no cyanosis and no edema.

 

ASSESSMENT:  Mr. Meija with:

1. Elevated white count, LP negative, probably viral in nature.  The patient has also

A1c of 7.9, new onset type 2 diabetes. 

 

PLAN:  Okay to discharge and it is okay to Dr. Alberto.  Metformin and atorvastatin has

been written for at this time.  Further recommendation per clinical course and also Dr. Alberto's recommendation. 

 

 

 

 

______________________________

Josh Ferraro MD

 

ASJ/MODL

D:  08/01/2020 06:58:44

T:  08/01/2020 08:07:25

Job #:  [id number]

## 2020-08-01 NOTE — NUR
Pt discharged home at this time. Pt verbalized understanding of all discharge instructions 
and follow up appointments. Pt was discharged with two prescriptions and verbalized 
understanding of all new medications.

## 2020-08-10 LAB
ALB CSF/SERPL: (no result) {RATIO}
IGG/ALB CSF: (no result) {RATIO}

## 2022-05-31 NOTE — EMERGENCY DEPARTMENT NOTE
History of Present Illnes


History of Present Illness


Chief Complaint:  COVID PUI


History of Present Illness


This is a 49 year old  male arrives to the ED with complaints of 

generalized malaise and weakness. 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

he was told he had sugar in the urine. Patient states his wife a diabetic and he

was checking his blood sugar at home she noted to be in the 250s. Patient also 

complaining generalized malaise and weakness and loss of taste.


Historian:  Patient


Arrival Mode:  Car


Onset (how long ago):  day(s)


Severity:  mild


Duration (how long):  day(s)


Timing of current episode:  constant


Progression:  worsening


Context:  Reports recent illness


Relieving factors:  none


Exacerbating factors:  none





Past Medical/Family History


Physician Review


I have reviewed the patient's past medical and family history.  Any updates have

been documented here.





Past Medical History


Recent Fever:  Yes


Clinical Suspicion of Infectio:  No


New/Unexplained Change in Ment:  No


Past Medical History:  Diabetes, Hyperlipedemia


Past Surgical History:  None





Other


Last Tetanus:  UTD





Review of Systems


Review of Systems


Constitutional:  Reports no symptoms, Reports as per HPI, Reports chills, 

Reports fever


EENTM:  Reports no symptoms


Cardiovascular:  Reports no symptoms


Respiratory:  Reports as per HPI


Gastrointestinal:  Reports no symptoms


Genitourinary:  Reports no symptoms


Musculoskeletal:  Reports no symptoms


Integumentary:  Reports no symptoms


Neurological:  Reports no symptoms


Psychological:  Reports no symptoms


Endocrine:  Reports as per HPI


Hematological/Lymphatic:  Reports no symptoms





Physical Exam


Related Data


Allergies:  


Coded Allergies:  


     No Known Drug Allergies (Verified  Allergy, Mild, 2/1/11)


Triage Vital Signs





Vital Signs








  Date Time  Temp Pulse Resp B/P (MAP) Pulse Ox O2 Delivery O2 Flow Rate FiO2


 


7/31/20 08:41 100.6 122 19 130/84 98 Room Air  








Vital signs reviewed:  Yes





Physical Exam


CONSTITUTIONAL





Constitutional:  Present obese


HENT


HENT:  Present normocephalic, Present atraumatic, Present oropharynx 

clear/moist, Present nose normal


HENT L/R:  Present left ext ear normal, Present right ext ear normal


EYES





Eyes:  Reports PERRL, Reports conjunctivae normal


NECK


Neck:  Present ROM normal


PULMONARY


Pulmonary:  Present effort normal, Present breath sounds normal


CARDIOVASCULAR





Cardiovascular:  Present regular rhythm, Present heart sounds normal, Present 

capillary refill normal, Present normal rate


GASTROINTESTINAL





Abdominal:  Present soft, Present nontender, Present bowel sounds normal


GENITOURINARY





Genitourinary:  Present exam deferred


SKIN


Skin:  Present warm, Present dry


MUSCULOSKELETAL





Musculoskeletal:  Present ROM normal


NEUROLOGICAL





Neurological:  Present alert, Present oriented x 3, Present no gross motor or 

sensory deficits


PSYCHOLOGICAL


Psychological:  Present mood/affect normal, Present judgement normal





Results


Laboratory


Laboratory





Laboratory Tests








Test


 7/31/20


08:45








Laboratory Tests








Test


 7/31/20


11:37 7/31/20


10:23 7/31/20


09:48 7/31/20


08:45


 


Lactic Acid Level


 1.2 mmol/L


(0.5-2.0) 


 


 





 


Urine Color


 


 Yellow


(YELLOW) 


 





 


Urine Clarity  Clear (CLEAR)   


 


Urine pH  5.5 (5 - 7)   


 


Urine Specific Gravity


 


 1.020


(1.010-1.025) 


 





 


Urine Protein


 


 Negative


(NEGATIVE) 


 





 


Urine Glucose (UA)  1+ (NEGATIVE)   


 


Urine Ketones


 


 Negative


(NEGATIVE) 


 





 


Urine Blood


 


 Negative


(NEGATIVE) 


 





 


Urine Nitrite


 


 Negative


(NEGATIVE) 


 





 


Urine Bilirubin


 


 Negative


(NEGATIVE) 


 





 


Urine Urobilinogen


 


 0.2 mg/dL (0.2


- 1) 


 





 


Urine Leukocyte Esterase


 


 Negative


(NEGATIVE) 


 





 


Urine RBC  0-5 /HPF (0-5)   


 


Urine WBC  0-5 /HPF (0-5)   


 


Urine Epithelial Cells


 


 Few /LPF


(NONE) 


 





 


Urine Bacteria


 


 Rare /HPF


(NONE) 


 





 


White Blood Count


 


 


 


 19.29 x10e3/uL


(4.8-10.8)


 


Red Blood Count


 


 


 


 5.11 x10e6/uL


(4.3-5.7)


 


Hemoglobin


 


 


 


 15.0 g/dL


(14.0-18.0)


 


Hematocrit


 


 


 


 44.1 %


(38.2-49.6)


 


Mean Corpuscular Volume


 


 


 


 86.3 fL


(81-99)


 


Mean Corpuscular Hemoglobin


 


 


 


 29.4 pg


(28-32)


 


Mean Corpuscular Hemoglobin


Concent 


 


 


 34.0 g/dL


(31-35)


 


Red Cell Distribution Width


 


 


 


 12.6 %


(11.7-14.4)


 


Platelet Count


 


 


 


 204 x10e3/uL


(140-360)


 


Neutrophils (%) (Auto)


 


 


 


 81.3 %


(38.7-80.0)


 


Lymphocytes (%) (Auto)


 


 


 


 10.5 %


(18.0-39.1)


 


Monocytes (%) (Auto)


 


 


 


 6.6  %


(4.4-11.3)


 


Eosinophils (%) (Auto)


 


 


 


 0.5 %


(0.0-6.0)


 


Basophils (%) (Auto)


 


 


 


 0.4 %


(0.0-1.0)


 


Neutrophils # (Auto)    15.7 (2.1-6.9) 


 


Lymphocytes # (Auto)    2.0 (1.0-3.2) 


 


Monocytes # (Auto)    1.3 (0.2-0.8) 


 


Eosinophils # (Auto)    0.1 (0.0-0.4) 


 


Basophils # (Auto)    0.1 (0.0-0.1) 


 


Absolute Immature Granulocyte


(auto 


 


 


 0.13 x10e3/uL


(0-0.1)


 


Sodium Level


 


 


 


 136 mmol/L


(136-145)


 


Potassium Level


 


 


 


 3.9 mmol/L


(3.5-5.1)


 


Chloride Level


 


 


 


 101 mmol/L


()


 


Carbon Dioxide Level


 


 


 


 26 mmol/L


(22-29)


 


Anion Gap


 


 


 


 12.9 mmol/L


(8-16)


 


Blood Urea Nitrogen


 


 


 


 10 mg/dL


(7-26)


 


Creatinine


 


 


 


 1.02 mg/dL


(0.72-1.25)


 


Estimat Glomerular Filtration


Rate 


 


 


 > 60 ML/MIN


(60-)


 


BUN/Creatinine Ratio    10 (6-25) 


 


Glucose Level


 


 


 


 174 mg/dL


()


 


Calcium Level


 


 


 


 8.6 mg/dL


(8.4-10.2)


 


Total Bilirubin


 


 


 


 0.6 mg/dL


(0.2-1.2)


 


Aspartate Amino Transf


(AST/SGOT) 


 


 


 20 IU/L (5-34) 





 


Alanine Aminotransferase


(ALT/SGPT) 


 


 


 48 IU/L (0-55) 





 


Alkaline Phosphatase


 


 


 


 80 IU/L


()


 


Creatine Kinase


 


 


 


 175 IU/L


()


 


Creatine Kinase MB


 


 


 


 1.10 ng/mL


(0-5.0)


 


Troponin I


 


 


 


 0.004 ng/mL


(0-0.300)


 


Total Protein


 


 


 


 7.3 g/dL


(6.5-8.1)


 


Albumin


 


 


 


 3.7 g/dL


(3.5-5.0)


 


Globulin


 


 


 


 3.6 g/dL


(2.3-3.5)


 


Albumin/Globulin Ratio    1.0 (0.8-2.0) 


 


Lipase    35 U/L (8-78) 








Lab results reviewed:  Yes





Imaging


Imaging results reviewed:  Yes





Procedures


Lumbar Puncture


Time out performed:  Yes


Patient position:  upright


Skin prep:  0.5% Chlorhexidine/Alcohol


Local anesthetic:  lidocaine 1%


Spinal needle gauge:  20G


Interspace used:  L4-L5


Fluid initially obtained:  bloody


Complications:  traumatic tap





Assessment & Plan


Medical Decision Making


MDM


49-year-old well-appearing male arrives to the ED with elevated blood sugar, 

patient is a newly diagnosed diabetic, patient also noted be febrile and 

tachycardic with signs and symptoms that were initially concerning for the 

coronavirus.


Patient's chest x-ray was normal, with complaints of a headache there was a 

possible suspicion of meningitis. LP done by me, likely traumatic. Normal 

opening pressure.








Concerns of sepsis were noted at 1000


Left lactic acid normal, blood cultures sent, broad spectrum antibiotics given


No source of infection present at time of admission, patient empirically 

covered.





Assessment & Plan


Final Impression:  


(1) COVID-19


Depart Disposition:  HOME, SELF-CARE


Last Vital Signs











  Date Time  Temp Pulse Resp B/P (MAP) Pulse Ox O2 Delivery O2 Flow Rate FiO2


 


7/31/20 08:41 100.6 122 19 130/84 98 Room Air  








Home Meds


Reported Medications


Metronidazole (FLAGYL) 250 Mg Tablet, 500 MG PO TID, #42


   3/20/20


Ciprofloxacin Hcl (CIPRO) 500 Mg Tablet, 500 MG PO Q12H, #28 TAB


   3/20/20


Medications in the ED





Sodium Chloride 1,000 ml @  0 mls/hr Q0M STAT IV ;  Start 7/31/20 at 08:48;  

Stop 7/31/20 at 08:50;  Status DC


Sodium Chloride 1,000 ml @  0 mls/hr Q0M STAT IV ;  Start 7/31/20 at 08:48;  

Stop 7/31/20 at 08:50;  Status DC


Acetaminophen 650 mg ONCE  ONCE PO ;  Start 7/31/20 at 09:00;  Stop 7/31/20 at 

09:01;  Status DC











JOHN VALDES,             Jul 31, 2020 09:32
anxious

## 2022-06-23 NOTE — XMS REPORT
Continuity of Care Document

                             Created on: 2020



MIRELLA COTTRELL

External Reference #: [id number]

: 1970

Sex: Male



Demographics





                          Address                   [address]

[place]  [address]

 

                          Home Phone                [phone]

 

                          Preferred Language        Unknown

 

                          Marital Status            Unknown

 

                          Gnosticism Affiliation     Unknown

 

                          Race                      Unknown

 

                                        Additional Race(s)  

 

                          Ethnic Group              Unknown





Author





                          Author                    [facility]

t

 

                          Organization              [facility]

 

                          Address                   [address]

[place]  [address]



 

                          Phone                     Unavailable







Care Team Providers





                    Care Team Member Name Role                Phone

 

                    ALONDRA ROSAS MD  PCP                 [phone]

 

                    ALONDRA ROSAS     Attphys             Unavailable

 

                    LAZARO JAQUEZ Attphys             Unavailable

 

                    ALONDRA ROSAS     Admphys             Unavailable







Payers





           Payer Name Policy Type Policy Number Effective Date Expiration Date ALONDRA diaz

 

           [employer] [place] [employer]            [id number] 2015 00:00:00     

       [facility]







Problems

This patient has no known problems.



Allergies, Adverse Reactions, Alerts

This patient has no known allergies or adverse reactions.



Medications





             Ordered Medication Name Filled Medication Name Start Date   Stop Da

te    Current 

Medication? Ordering Clinician Indication Dosage     Frequency  Signature (SIG) 

Comments                  Components                Source

 

                    Ciprofloxacin Hcl (Cipro) 500 Mg Tablet Ciprofloxacin Hcl (C

ipro) 500 Mg Tablet 

              Yes                  500           Every 12 Hours               [facility]

 

             Metronidazole (Flagyl) 250 Mg Tablet Metronidazole (Flagyl) 250 Mg 

Tablet                           

Yes                     500             Three Times A Day                 [facility]







Procedures





                Procedure       Date / Time Performed Performing Clinician Sour

e

 

                    Computed tomography of abdomen and pelvis with contrast 2020 00:00:00 

TANA DAVIS                          [facility]

 

                X-ray of chest, two views 2020 00:00:00 TANA DAVIS  [facility]







Encounters





             Start Date/Time End Date/Time Encounter Type Admission Type Attendi

[facility] Facility   Care Department Encounter ID    Source

 

             2020 23:27:00 2020 13:03:00 Discharged Inpatient 1     

       INDIRA JAQUEZ         [facility]          [id number]    [facility]







Results





           Test Description Test Time  Test Comments Results    Result Comments 

Source

 

                CT BRAIN WO     2020 13:10:00                             

                                  

                                       [address] [place] [address]      
Patient Name: MIRELLA COTTRELL JR                                MR #: 
[id number]                  : 1970                                   
Age/Sex: 49/M  Acct #: [id number]                              Req #: [id number]
[id number]  Adm Physician: STEVEN ROSAS MD                                    
Ordered by: JOHN VALDES DO                         Report #: [id number]    
   Location: [facility]                                  Room/Bed: [place] [address]        
 
________________________________________________________________________________

___________________    Procedure: [id number] CT/CT BRAIN WO  Exam Date: 20 
                          Exam Time: 1152                                       
      REPORT STATUS: Signed    Exam: Head CT without contrast   History:  
Headache, fever   Comparison studies:  None      Technique:   Axial images were 
obtained from the skull base to the vertex.   Coronal and sagittal images 
reconstructed from the axial data.  Dose   modulation, iterative reconstruction,
and/or weight based adjustment of the   mA/kV was utilized to reduce the 
radiation dose to as low as reasonably   achievable.        Radiation dose:    
Total DLP: 832.18 mGy*cm.    Estimated effective dose: DLP x 0.015    
Intravenous contrast: None      Findings:      Scalp: Scarring present in the 
right occipital scalp near the vertex.   Bones: No fractures, blastic or lytic 
lesions.      Brain sulci: Appropriate for age.   Ventricles: Normal in size and
configuration. No hydrocephalus.   Extra-axial spaces: No masses, no fluid 
collection.       Parenchyma:    No abnormal densities.    No masses, 
hemorrhage, acute or chronic vascular insults.      Sellar/suprasellar region: 
No abnormalities.   Craniocervical junction: Patent foramen magnum. No Chiari 
one malformation.      Incidental findings:   Partially opacified right mastoids
with sclerotic changes as sequela chronic   information.         IMPRESSION:    
  No acute abnormalities.      Signed by: Dr. Alexx Kuo M.D. on 2020 
1:14 PM        Dictated By: ALEXX KUO MD  Electronically Signed By: ALEXX KUO MD on 20 1314  Transcribed By: ELIZA on 20 1314       
COPY TO:   JOHN VALDES DO                                     

 

                CHEST SINGLE (PORTABLE) 2020 10:28:00                     

                              

                                                   [facility]                        [address] [address]      Patient Name: MIRELLA COTTRELL JR                           
    MR #: [id number]                  : 1970                           
       Age/Sex: 49/M  Acct #: [id number]                              Req #: 
[id number]  Adm Physician:                                                      
Ordered by: JOHN VALDES DO                         Report #: [id number]    
   Location: ER                                      Room/Bed:                  
 
________________________________________________________________________________

___________________    Procedure: [id number] DX/CHEST SINGLE (PORTABLE)  Exam 
Date:                             Exam Time:                                    
          REPORT STATUS: Signed    EXAMINATION:  CHEST SINGLE (PORTABLE)        
 INDICATION: Pneumonia, cough      COMPARISON: Chest radiograph 2008, CT 
abdomen and pelvis 3/18/2020           FINDINGS:      LINES/TUBES:None      
LUNGS:The lungs are well-inflated. No focal consolidation or pulmonary edema.   
  PLEURA:No pleural effusion or pneumothorax.      MEDIASTINUM:The 
cardiomediastinal silhouette appears normal in size and shape.      BONES/SOFT 
TISSUES:No acute osseous injury.      ABDOMEN:No free air under the diaphragm.  
      IMPRESSION:    No focal pneumonia or pulmonary edema.      Signed by: 
Leia Oliveira MD on 2020 10:29 AM        Dictated By: LEIA OLIVEIRA MD  
Electronically Signed By: LEIA OLIVEIRA MD on 20 1029  Transcribed By: 
ELIZA on 20 1029       COPY TO:   JOHN VALDES DO                   

                  

 

                    Blood Culture       2020 20:42:00   

 

                                        Test Item

 

             Blood Culture (test code = 49161320) NO GROWTH AFTER 24 HOURS      

                      





[facility] Igkzapoy4902-78-71 08:32:00* 



             Test Item    Value        Reference Range Interpretation Comments

 

             Blood Urea Nitrogen (test code = 3094-0) 10           7-26         

              





[facility] Antonio[facility]UN/Creatinine Mvyvj1405-54-44 08:32:00* 



             Test Item    Value        Reference Range Interpretation Comments

 

             BUN/Creatinine Ratio (test code = 3097-3) 12           6-        

               





[facility]odium Jwowp5889-68-04 06:43:00* 



             Test Item    Value        Reference Range Interpretation Comments

 

             Sodium Level (test code = 2951-2) 138          136-145             

       





[facility] AntonioPotassium Bcavl1912-91-02 06:43:00* 



             Test Item    Value        Reference Range Interpretation Comments

 

             Potassium Level (test code = 2823-3) 3.5          3.5-5.1          

          





[facility] AntonioChloride Qdtct7689-55-02 06:43:00* 



             Test Item    Value        Reference Range Interpretation Comments

 

             Chloride Level (test code = 2075-0) 110                 H    

         





[facility] AntonioCarbon Dioxide Vtogw3699-45-78 06:43:00* 



             Test Item    Value        Reference Range Interpretation Comments

 

             Carbon Dioxide Level (test code = 2028-9) 23           22-29       

               





[facility] AntonioAnion Vth1584-06-59 06:43:00* 



             Test Item    Value        Reference Range Interpretation Comments

 

             Anion Gap (test code = 33037-3) 8.5          8-16                  

     





[facility] AntonioCreatinine2020-03-19 06:43:00* 



             Test Item    Value        Reference Range Interpretation Comments

 

             Creatinine (test code = 2160-0) 0.82         0.72-1.25             

     





[facility] AntonioEstimat Glomerular Filtration Rate
2020 06:43:00* 



             Test Item    Value        Reference Range Interpretation Comments

 

             Estimat Glomerular Filtration Rate (test code = 478341644) > 60    

     >60                        





Ranges were taken from the National Kidney Disease Education Program and the [facility] Foundation literature.Reference ranges:60 or greater: Gbcptm64-90 (
for 3 consecutive months): Chronic kidney disease 15 or less: Kidney failureChildren's Hospital of San AntonioGlucose Hsvbk9632-76-39 06:43:00* 



             Test Item    Value        Reference Range Interpretation Comments

 

             Glucose Level (test code = MLF3810) 124                 H    

         





[facility] AntonioCalcium Rtlxl6374-05-91 06:43:00* 



             Test Item    Value        Reference Range Interpretation Comments

 

             Calcium Level (test code = 26297-0) 7.9          8.4-10.2     L    

         





[facility] AntonioTotal Wwlntwlgx8922-00-44 06:43:00* 



             Test Item    Value        Reference Range Interpretation Comments

 

             Total Bilirubin (test code = 1975-2) 0.4          0.2-1.2          

          





[facility] AntonioAspartate Amino Transf (AST/SGOT)
2020 06:43:00* 



             Test Item    Value        Reference Range Interpretation Comments

 

                                        Aspartate Amino Transf (AST/SGOT) (test 

code = Aspartate Amino Transf 

(AST/SGOT))     15              5-34                             





[facility] AntonioAlanine Aminotransferase (ALT/SGPT)
2020 06:43:00* 



             Test Item    Value        Reference Range Interpretation Comments

 

             Alanine Aminotransferase (ALT/SGPT) (test code = 1742-6) 30        

   0-55                       





[facility] AntonioTotal Uuymkqf4412-80-09 06:43:00* 



             Test Item    Value        Reference Range Interpretation Comments

 

             Total Protein (test code = 2885-2) 6.3          6.5-8.1      L     

        





[facility] AntonioAlbumin2020-03-19 06:43:00* 



             Test Item    Value        Reference Range Interpretation Comments

 

             Albumin (test code = 1751-7) 3.0          3.5-5.0      L           

  





[facility] AntonioGlobulin2020-03-19 06:43:00* 



             Test Item    Value        Reference Range Interpretation Comments

 

             Globulin (test code = 04940-9) 3.3          2.3-3.5                

    





[facility] AntonioAlbumin/Globulin Ihuny7180-16-08 06:43:00
  * 



             Test Item    Value        Reference Range Interpretation Comments

 

             Albumin/Globulin Ratio (test code = 1759-0) 0.9          0.8-2.0   

                 





[facility] AntonioAlkaline Zpkyvgnkyei6538-07-14 06:43:00* 



             Test Item    Value        Reference Range Interpretation Comments

 

             Alkaline Phosphatase (test code = 6768-6) 61                 

               





[facility] AntonioWhite Blood Ivcrn0839-06-59 06:26:00* 



             Test Item    Value        Reference Range Interpretation Comments

 

             White Blood Count (test code = 6690-2) 8.31         4.8-10.8       

            





[facility] AntonioRed Blood Yqcrd8892-31-03 06:26:00* 



             Test Item    Value        Reference Range Interpretation Comments

 

             Red Blood Count (test code = 789-8) 4.82         4.3-5.7           

         





[facility] AntonioHemoglobin2020-03-19 06:26:00* 



             Test Item    Value        Reference Range Interpretation Comments

 

             Hemoglobin (test code = 21907-5) 14.3         14.0-18.0            

      





[facility] AntonioHematocrit2020-03-19 06:26:00* 



             Test Item    Value        Reference Range Interpretation Comments

 

             Hematocrit (test code = 4544-3) 42.5         38.2-49.6             

     





[facility] San AntonioMean Corpuscular Nmcxgz2588-34-61 
06:26:00* 



             Test Item    Value        Reference Range Interpretation Comments

 

             Mean Corpuscular Volume (test code = 787-2) 88.2         81-99     

                 





[facility] San AntonioMean Corpuscular Klnlszbfcz3298-73-82 
06:26:00* 



             Test Item    Value        Reference Range Interpretation Comments

 

             Mean Corpuscular Hemoglobin (test code = 785-6) 29.7         28-32 

                     





[facility] Hospital of San AntonioMean Corpuscular Hemoglobin Concent
2020 06:26:00* 



             Test Item    Value        Reference Range Interpretation Comments

 

             Mean Corpuscular Hemoglobin Concent (test code = 786-4) 33.6       

  31-35                      





[facility] Hospital of San AntonioRed Cell Distribution Qihad7941-92-68 
06:26:00* 



             Test Item    Value        Reference Range Interpretation Comments

 

             Red Cell Distribution Width (test code = 34769-9) 13.1         11.7

-14.4                  





[facility] Hospital [facility] San AntonioPlatelet Cjhdv1390-88-34 06:26:00* 



             Test Item    Value        Reference Range Interpretation Comments

 

             Platelet Count (test code = 777-3) 188          140-360            

        





[facility] AntonioNeutrophils (%) (Auto)2020 06:26:00
  * 



             Test Item    Value        Reference Range Interpretation Comments

 

             Neutrophils (%) (Auto) (test code = 45222-5) 74.4         38.7-80.0

                  





[facility] AntonioLymphocytes (%) (Auto)2020 06:26:00
  * 



             Test Item    Value        Reference Range Interpretation Comments

 

             Lymphocytes (%) (Auto) (test code = 736-9) 14.9         18.0-39.1  

  L             





[facility] AntonioMonocytes (%) (Auto)2020 06:26:00* 



             Test Item    Value        Reference Range Interpretation Comments

 

             Monocytes (%) (Auto) (test code = 5905-5) 8.9          4.4-11.3    

               





[facility] AntonioEosinophils (%) (Auto)2020 06:26:00
  * 



             Test Item    Value        Reference Range Interpretation Comments

 

             Eosinophils (%) (Auto) (test code = 713-8) 1.2          0.0-6.0    

                





[facility] AntonioBasophils (%) (Auto)2020 06:26:00* 



             Test Item    Value        Reference Range Interpretation Comments

 

             Basophils (%) (Auto) (test code = 706-2) 0.2          0.0-1.0      

              





[facility] Hospital of San AntonioIM GRANULOCYTES %2020 06:26:00* 



             Test Item    Value        Reference Range Interpretation Comments

 

             IM GRANULOCYTES % (test code = IM GRANULOCYTES %) 0.4          0.0-

1.0                    





[facility] AntonioNeutrophils # (Auto)2020 06:26:00* 



             Test Item    Value        Reference Range Interpretation Comments

 

             Neutrophils # (Auto) (test code = 751-8) 6.2          2.1-6.9      

              





[facility] AntonioLymphocytes # (Auto)2020 06:26:00* 



             Test Item    Value        Reference Range Interpretation Comments

 

             Lymphocytes # (Auto) (test code = 72986-3) 1.2          1.0-3.2    

                





[facility] AntonioMonocytes # (Auto)2020 06:26:00* 



             Test Item    Value        Reference Range Interpretation Comments

 

             Monocytes # (Auto) (test code = 742-7) 0.7          0.2-0.8        

            





[facility] AntonioEosinophils # (Auto)2020 06:26:00* 



             Test Item    Value        Reference Range Interpretation Comments

 

             Eosinophils # (Auto) (test code = 711-2) 0.1          0.0-0.4      

              





[facility] AntonioBasophils # (Auto)2020 06:26:00* 



             Test Item    Value        Reference Range Interpretation Comments

 

             Basophils # (Auto) (test code = 704-7) 0.0          0.0-0.1        

            





[facility] AntonioAbsolute Immature Granulocyte (auto
2020 06:26:00* 



             Test Item    Value        Reference Range Interpretation Comments

 

                                        Absolute Immature Granulocyte (auto (aileen

t code = Absolute Immature Granulocyte 

(auto)          0.03            0-0.1                            





[facility] AntonioCT ABDOMEN/PELVIS -28-15 22:42:00   
                                                                                
 [place] [address]      Patient Name: MIRELLA COTTRELL         
                         MR #: [id number]                     : 1970   
                               Age/Sex: 49/M  Acct #: [id number]              
               Req #: [id number]  Adm Physician:                                
                     Ordered by: TANA DAVIS NP                            
Report #: [id number]        Location: ER                                      
Room/Bed:                     ____________________________________________
_______________________________________________________    Procedure: [id number] 
CT/CT ABDOMEN/PELVIS W  Exam Date:                             Exam Time:       
                                       REPORT STATUS: Signed    EXAM: CT Abdomen
and Pelvis WITH contrast     INDICATION: Abdominal pain, nausea, vomiting diar
laura. Fever and chills.   COMPARISON: None.   TECHNIQUE: Abdomen and pelvis were
scanned utilizing a multidetector helical   scanner from the lung base to the p
ubic symphysis after administration of IV   contrast. Coronal and sagittal refor
mations were obtained. Routine protocol was   performed. Scan was performed when
during portal venous phase.               IV CONTRAST: 100 cc Isovue-370        
      ORAL CONTRAST: Water               RADIATION DOSE: Total DLP: 718.03 mGy
*cm                Estimated effective dose: (DLP x 0.015 x size factor) mSv    
          COMPLICATIONS: None      FINDINGS:      LINES and TUBES: None.      L
OWER THORAX:  There is bibasilar atelectasis.      HEPATOBILIARY: The liver is d
iffuse hypodense compared to the spleen,   consistent with diffuse hepatic diffu
se hepatic steatosis.  No focal hepatic   lesions. No biliary ductal dilation.  
    GALLBLADDER: No radio-opaque stones or sludge.  No wall thickening.      SP
DARREN: No splenomegaly.       PANCREAS: No focal masses or ductal dilatation.    
   ADRENALS: No adrenal nodules          KIDNEYS/URETERS: Kidneys enhance symme
trically.  No hydronephrosis. No cystic   or solid mass lesions.  No stones.    
 GI TRACT: No abnormal distention, wall thickening, or evidence of bowel   obst
ruction.       Appendix is normal.      PELVIC ORGANS/BLADDER: Unremarkable.    
 LYMPH NODES: No lymphadenopathy.      VESSELS: There is mild atherosclerotic d
isease in the aorta and major arterial   branches.      PERITONEUM / RETROPERITO
NEUM: No free air or fluid.      BONES: Unremarkable.      SOFT TISSUES: Unremar
kable.                  IMPRESSION:    1.  No acute abdominal pelvic abnormality
.   2.  Hepatic steatosis.      Signed by: Dr. SABAS Palomares M.D. on 3/1
2020 10:52 PM        Dictated By: BRAYAN PALOMARES MD, MD  Electronically Signed 
By: BRAYAN PALOMARES MD, MD on 20 1469  Transcribed By: ELIZA on 20 2
252       COPY TO:   TANA DAVIS NP         CHEST 2 NKSVD3954-38-10 22:31:00  
                                                                                
  [place] [address]      Patient Name: MIRELLA COTTRELL         
                         MR #: [id number]                     : 1970   
                               Age/Sex: 49/M  Acct #: [id number]              
               Req #: [id number]  Adm Physician:                                
                     Ordered by: CACACE, TANA J NP                            
Report #: [id number]        Location:                                       
Room/Bed:                     ____________________________________________
_______________________________________________________    Procedure: [id number] 
DX/CHEST 2 VIEWS  Exam Date: 20                            Exam Time: 
                                             REPORT STATUS: Signed    EXAMINATI
ON:  CHEST 2 VIEWS          INDICATION: Pain.      COMPARISON:  None           F
INDINGS:   TUBES and LINES:  None.      LUNGS:  Lungs are well inflated.  Mild p
erihilar, peribronchial thickening and   perihilar streaky densities may reflect
viral infection versus reactive airway   disease.   Bibasilar atelectasis.      
PLEURA:  No pleural effusion or pneumothorax.      HEART AND MEDIASTINUM:  The 
cardiomediastinal silhouette is unremarkable.          BONES AND SOFT TISSUES:  
No acute osseous lesion.  Soft tissues are   unremarkable.      UPPER ABDOMEN: N
o free air under the diaphragm.          IMPRESSION:    Mild bilateral perihilar
, peribronchial thickening. No focal consolidations.         Signed by: Dr. SABAS Palomares M.D. on 3/18/2020 10:33 PM        Dictated By: BRAYAN PALOMARES MD, MD  Electronically Signed By: BRAYAN PALOMARES MD, MD on 20  Transcri
bed By: ELIZA on 20       COPY TO:   TANA DAVIS NP         
Lactic Acid Pxyle0623-08-89 21:57:00* 



             Test Item    Value        Reference Range Interpretation Comments

 

             Lactic Acid Level (test code = Lactic Acid Level) 2.0          0.5-

2.0                    





[facility] AntonioInfluenza Virus Types A,B Antigen
2020 21:09:00* 



             Test Item    Value        Reference Range Interpretation Comments

 

             Influenza Virus Types A,B Antigen (test code = 05300-8) NEGATIVE   

  NEGATIVE                   





[facility] AntonioLipase2020-03-18 21:08:00* 



             Test Item    Value        Reference Range Interpretation Comments

 

             Lipase (test code = 3040-3) 18           8-                      

 





[facility] AntonioGroup A Streptococcus [id number] 
21:05:00* 



             Test Item    Value        Reference Range Interpretation Comments

 

             Group A Streptococcus Screen (test code = 81340-1) NEGATIVE     NEG

ATIVE                   





[id number]03:00* 



             Test Item    Value        Reference Range Interpretation Comments

 

             Urine WBC (test code = 5821-4) NONE         0-5                    

    





[facility] [id number] 21:03:00* 



             Test Item    Value        Reference Range Interpretation Comments

 

             Urine RBC (test code = 66924-3) NONE         0-5                   

     





[facility] [id number] 21:03:00* 



             Test Item    Value        Reference Range Interpretation Comments

 

             Urine Bacteria (test code = 00892-0) FEW          NONE             

          





[facility] Epithelial [id number] 21:03:00
  * 



             Test Item    Value        Reference Range Interpretation Comments

 

             Urine Epithelial Cells (test code = 10977-8) NONE         NONE     

                  





[facility] [id number] 20:52:00* 



             Test Item    Value        Reference Range Interpretation Comments

 

             Urine Color (test code = 5778-6) YELLOW       YELLOW               

      





[facility] [id number] 20:52:00* 



             Test Item    Value        Reference Range Interpretation Comments

 

             Urine Clarity (test code = 35263-1) SL CLOUDY    CLEAR        H    

         





[facility] Specific [id number] 20:52:00
  * 



             Test Item    Value        Reference Range Interpretation Comments

 

             Urine Specific Gravity (test code = 5811-5) 1.030        1.010-1.02

5  H             





[facility] [id number] 20:52:00* 



             Test Item    Value        Reference Range Interpretation Comments

 

             Urine pH (test code = 84783-1) 5.5          5-7                    

    





[facility] Leukocyte [id number] 
20:52:00* 



             Test Item    Value        Reference Range Interpretation Comments

 

             Urine Leukocyte Esterase (test code = 5799-2) NEGATIVE     NEGATIVE

                   





[facility] [id number] 20:52:00* 



             Test Item    Value        Reference Range Interpretation Comments

 

             Urine Nitrite (test code = 33925-0) NEGATIVE     NEGATIVE          

         





[facility] [id number] 20:52:00* 



             Test Item    Value        Reference Range Interpretation Comments

 

             Urine Protein (test code = 5804-0) NEGATIVE     NEGATIVE           

        





[facility] Glucose [facility]2020 20:52:00* 



             Test Item    Value        Reference Range Interpretation Comments

 

             Urine Glucose (UA) (test code = 2349-9) NEGATIVE     NEGATIVE      

             





[facility] [id number] 20:52:00* 



             Test Item    Value        Reference Range Interpretation Comments

 

             Urine Ketones (test code = 88131-8) NEGATIVE     NEGATIVE          

         





[facility] [id number] 20:52:00* 



             Test Item    Value        Reference Range Interpretation Comments

 

             Urine Urobilinogen (test code = 38840-0) 0.2          0.2-1        

              





[facility] [id number] 20:52:00* 



             Test Item    Value        Reference Range Interpretation Comments

 

             Urine Bilirubin (test code = 1978-6) NEGATIVE     NEGATIVE         

          





[facility] [id number] 20:52:00* 



             Test Item    Value        Reference Range Interpretation Comments

 

             Urine Blood (test code = 63819-2) NEGATIVE     NEGATIVE            

       





[facility] Cimzia Counseling:  I discussed with the patient the risks of Cimzia including but not limited to immunosuppression, allergic reactions and infections.  The patient understands that monitoring is required including a PPD at baseline and must alert us or the primary physician if symptoms of infection or other concerning signs are noted.

## 2023-09-28 NOTE — DISCHARGE SUMMARY
Chief Concern:  Skin exam    Patient presents today unaccompanied.    History of Present Illness  Problem:  Skin cancer follow-up  Location:  See skin cancer flow sheet  Symptoms: No concerns about site(s)   Treatment:  See skin cancer flow sheet     Flaky spot on the left cheek, left forearm    Biopsy proven lentigo left radial forearm 6/12/2023    Patient uses Ketoconazole PRN when she gets rash on pannus fold.  Occurs more in summer or when sitting for long periods of time.     Saw Dr Trejo 7/14/2023, recommended hydrocortisone 1% mixed with clotrimazole for perleche    Sun protection when outside: sunscreen always, avoids sun exposure  Daily moisturizer with sunscreen: yes  Self skin exams: Occasionally  History of blistering sunburns: Yes    Tanning bed use: previously used tanning beds, not using currently    Pacemaker: No  Anticoagulation:Yes: Aspirin  Allergy to lidocaine or epinephrine sensitivity: No      Past Medical History  Reviewed in Epic    Breast cancer S/P lumpectomy    Biopsy proven labial lentigo, lower lip 4/12/2021    Melanoma: No     DATE DIAGNOSIS LOCATION TREATMENT   3/2016 Basal cell carcinoma  Right ala Mohs   12/12/2019 Basal cell carcinoma  Left tragus Mohs   2/7/2023 basal cell carcinoma  Right upper cutaneous lip Mohs 3/20/2023     Social History  Occupation:  retired    Family History  Melanoma: No     Review of Systems  GENERAL: General health lately is good  ALLERGIES:  Reviewed in EPIC.    Physical Exam, Assessment and Plan  The patient is alert, responsive, and mood is appropriate.  Examination of the skin included the face, scalp, ears, neck, right upper extremity, left upper extremity, chest, back, abdomen, buttocks, right lower extremity, left lower extremity, hands, feet. Patient wearing toenail polish.     Skin cancer surveillance  Well-healed scar with no evidence of recurrence on the right ala, left tragus, right upper cutaneous lip.  Reassured patient that site(s) are  HOSPITAL COURSE:  The patient came into emergency room with gastrointestinal symptoms of

nausea, vomiting, and high-grade fever.  The patient was feeling better.  Metronidazole

and cefepime were started. Initially, the patient had a COVID-19 screen with the onset

of shortness of breath, fever, nausea, and diarrhea.  The patient was kept in isolation

for about a day.  Cultures were done.  Blood cultures were negative.  COVID-19 was done

and was also negative.  CT of the abdomen and pelvis was negative.  Chest x-ray showed

no focal consolidation.  No respiratory distress was noted.  The 2nd day, the patient

was moved out of the isolation unit.  The patient's electrolytes were repleted.  The

patient's nausea and vomiting resolved, diarrhea resolved.  The patient is better.  The

patient was sent home to be followed up with ID.  Cipro and Flagyl for three weeks were

given to the patient on discharge.  For further information, look into the chart. 

 

FINAL DIAGNOSES:  

1. Sepsis.

2. Gastrointestinal colitis.

3. Electrolyte abnormalities.

4. Hyperkalemia.

 

PLAN:  Plan was to discharge the patient.  Followup in about a week's time.  Check blood

work and also again follow up in COVID-19. 

 

 

 

 

______________________________

MD MONICO Villasenor/DAVE

D:  03/29/2020 07:22:57

T:  03/29/2020 08:54:30

Job #:  [id number] well healed with no evidence of recurrence. Continue to monitor.    History of actinic keratosis   No visible actinic keratosis lesions present today.  Recommended monitoring skin at home, sun protection, and regular dermatology follow-up for skin exams.     Macular seborrheic keratosis  Brown macules on the forearms  Reassured patient lesion appears benign today.  No treatment required.  Monitor for changes.       Seborrheic keratosis  Cobbled brown papule left cheek, right dorsal hand, left volar forearm.     Reassured patient lesion appears benign today.  No treatment required.  Monitor for changes.    Nevi  Brown macules and papules scattered on the trunk and extremities.  Reassured patient lesions appear benign today.  No treatment required.  Monitor for changes.      Intertrigo  Left lateral pannus and inguinal fold clear today    When flared, apply ketoconazole 2% cream to affected areas PRN.     History of skin lesion, per patient report, no visible lesion today  No visible lesion on the right superior medial helix. Discussed possibility of CNH. Discussed use of donut pillow to relieve pressure.     Angular cheilitis  Clear today    For flares, recommended treating with hydrocortisone 1% cream combined with clotrimazole 1% cream in equal parts, applied BID to affected areas. Only apply to affected areas and stop when clear.     Use Vaseline throughout the day, especially at bedtime, to provide a barrier against saliva and irritants.      Advised monthly self skin exams. Advised patient to contact clinic if any new or changing lesions are identified. Recommended sun protection.    Return in about 6 months (around 4/5/2024) for 30 min skin exam.     On 10/5/2023, Marialuisa CHAMPION MA scribed the services personally performed by MD JAYCEE Tyson, Dr. Enedina Amanda, attest that I saw and examined the patient and agree with the above documentation as scribed.  The documentation recorded by the scribe  accurately and completely reflects the service(s) I personally performed and the decisions made by me.

## 2025-07-22 ENCOUNTER — HOSPITAL ENCOUNTER (EMERGENCY)
Dept: HOSPITAL 88 - ER | Age: 55
LOS: 1 days | Discharge: HOME | End: 2025-07-23
Payer: SELF-PAY

## 2025-07-22 VITALS — WEIGHT: 213 LBS | BODY MASS INDEX: 35.49 KG/M2 | HEIGHT: 65 IN

## 2025-07-22 VITALS — TEMPERATURE: 98 F

## 2025-07-22 DIAGNOSIS — R11.2: ICD-10-CM

## 2025-07-22 DIAGNOSIS — R10.13: ICD-10-CM

## 2025-07-22 DIAGNOSIS — R10.33: Primary | ICD-10-CM

## 2025-07-22 DIAGNOSIS — E78.5: ICD-10-CM

## 2025-07-22 DIAGNOSIS — E11.65: ICD-10-CM

## 2025-07-22 LAB
ACANTHOCYTES BLD QL SMEAR: (no result)
ALBUMIN SERPL-MCNC: 3.8 G/DL (ref 3.5–5)
ALBUMIN/GLOB SERPL: 1.2 {RATIO} (ref 0.8–2)
ALP SERPL-CCNC: 65 IU/L (ref 40–150)
ALT SERPL-CCNC: 26 IU/L (ref 0–55)
AMM URATE CRY URNS QL MICRO: (no result)
AMORPH SED URNS QL MICRO: (no result)
ANION GAP SERPL CALC-SCNC: 14.8 MMOL/L (ref 8–16)
ANISOCYTOSIS BLD QL SMEAR: (no result)
AUER BODIES BLD QL SMEAR: (no result)
BACTERIA URNS QL MICRO: (no result) /HPF
BASO STIPL BLD QL SMEAR: (no result)
BASOPHILS # BLD AUTO: 0.1 10*3/UL (ref 0–0.1)
BASOPHILS NFR BLD AUTO: 0.6 % (ref 0–1)
BASOPHILS NFR SPEC MANUAL: (no result) % (ref 0–1.5)
BILIRUB SERPL-MCNC: 0.6 MG/DL (ref 0.2–1.2)
BILIRUB UR QL: NEGATIVE
BLASTS NFR BLD MANUAL: (no result) %
BUN SERPL-MCNC: 9 MG/DL (ref 7–26)
BUN/CREAT SERPL: 9 (ref 6–25)
BURR CELLS BLD QL SMEAR: (no result)
BURR CELLS BLD QL SMEAR: (no result)
CA CARBONATE CRY URNS QL MICRO: (no result)
CALCIUM SERPL-MCNC: 8.8 MG/DL (ref 8.4–10.2)
CAOX CRY URNS QL MICRO: (no result)
CASTS URNS QL MICRO: (no result)
CHLORIDE SERPL-SCNC: 102 MMOL/L (ref 98–107)
CK SERPL-CCNC: 113 IU/L (ref 30–200)
CLARITY UR: CLEAR
CO2 SERPL-SCNC: 24 MMOL/L (ref 22–29)
COARSE GRAN CASTS URNS QL MICRO: (no result)
COLOR UR: YELLOW
CREAT SERPL-MCNC: 0.95 MG/DL (ref 0.72–1.25)
CRYSTALS URNS QL MICRO: (no result)
CYSTINE CRY #/AREA URNS LPF: (no result) /[LPF]
DACRYOCYTES BLD QL SMEAR: (no result)
DEPRECATED NEUTROPHILS # BLD AUTO: 11.1 10*3/UL (ref 2.1–6.9)
DEPRECATED RBC URNS MANUAL-ACNC: (no result) /HPF (ref 0–5)
DOHLE BOD BLD QL SMEAR: (no result)
EGFRCR SERPLBLD CKD-EPI 2021: 95 ML/MIN (ref 60–?)
ELLIPTOCYTES BLD QL SMEAR: (no result)
EOSINOPHIL # BLD AUTO: 0.2 10*3/UL (ref 0–0.4)
EOSINOPHIL NFR BLD AUTO: 0.9 % (ref 0–6)
EOSINOPHIL NFR BLD MANUAL: (no result) % (ref 0–7)
EPI CELLS URNS QL MICRO: (no result) /LPF
ERYTHROCYTE [DISTWIDTH] IN CORD BLOOD: 12.8 % (ref 11.7–14.4)
FINE GRAN CASTS #/AREA URNS LPF: (no result) /[LPF]
GIANT PLATELETS BLD QL SMEAR: (no result)
GLOBULIN PLAS-MCNC: 3.3 G/DL (ref 2.3–3.5)
GLUCOSE SERPLBLD-MCNC: 140 MG/DL (ref 74–118)
GLUCOSE UR QL STRIP.AUTO: NEGATIVE
HCT VFR BLD AUTO: 47.9 % (ref 38.2–49.6)
HELMET CELLS BLD QL SMEAR: (no result)
HGB BLD-MCNC: 16.6 G/DL (ref 14–18)
HOWELL-JOLLY BOD BLD QL SMEAR: (no result)
HYALINE CASTS #/AREA URNS LPF: (no result) /[LPF] (ref 0–1)
HYPOCHROMIA BLD QL SMEAR: (no result)
KETONES UR QL STRIP.AUTO: NEGATIVE
LEUCINE CRY URNS QL MICRO: (no result)
LEUKOCYTE ESTERASE UR QL STRIP.AUTO: NEGATIVE
LG PLATELETS BLD QL SMEAR: (no result)
LIPASE SERPL-CCNC: 23 U/L (ref 8–78)
LYMPHOCYTES # BLD: 3.5 10*3/UL (ref 1–3.2)
LYMPHOCYTES NFR BLD AUTO: 21.5 % (ref 18–39.1)
LYMPHOCYTES NFR BLD MANUAL: (no result) % (ref 19–48)
MACROCYTES BLD QL SMEAR: (no result)
MCH RBC QN AUTO: 30.5 PG (ref 28–32)
MCHC RBC AUTO-ENTMCNC: 34.7 G/DL (ref 31–35)
MCV RBC AUTO: 87.9 FL (ref 81–99)
METAMYELOCYTES NFR BLD MANUAL: (no result) % (ref 0–0)
MICROCYTES BLD QL SMEAR: (no result)
MONOCYTES # BLD AUTO: 1.4 10*3/UL (ref 0.2–0.8)
MONOCYTES NFR BLD AUTO: 8.5 % (ref 4.4–11.3)
MONOCYTES NFR BLD MANUAL: (no result) % (ref 3.4–9)
MUCOUS THREADS URNS QL MICRO: (no result)
MYELOCYTES NFR BLD MANUAL: (no result) % (ref 0–0)
NEUTS BAND NFR BLD MANUAL: (no result) %
NEUTS HYPERSEG BLD QL SMEAR: (no result)
NEUTS SEG NFR BLD AUTO: 68.1 % (ref 38.7–80)
NEUTS SEG NFR BLD MANUAL: (no result) % (ref 40–74)
NEUTS VAC BLD QL SMEAR: (no result)
NITRITE UR QL STRIP.AUTO: NEGATIVE
NON-SQ EPI CELLS URNS QL MICRO: (no result)
NRBC # BLD: (no result) 10*3/UL
NRBC/RBC NFR BLD MANUAL: (no result) %
OVAL FAT BODIES URNS QL MICRO: (no result)
OVALOCYTES BLD QL SMEAR: (no result)
PAPPENHEIMER BOD BLD QL SMEAR: (no result)
PH UR STRIP.AUTO: 6 [PH] (ref 5–7)
PLASMA CELLS NFR BLD: (no result) %
PLAT MORPH BLD: (no result)
PLATELET # BLD AUTO: 320 X10E3/UL (ref 140–360)
PLATELET # BLD EST: (no result) 10*3/UL
PLATELET CLUMP BLD QL SMEAR: (no result)
POIKILOCYTOSIS BLD QL SMEAR: (no result)
POLYCHROMASIA BLD QL SMEAR: (no result)
POTASSIUM SERPL-SCNC: 3.8 MMOL/L (ref 3.5–5.1)
PROMYELOCYTES NFR BLD MANUAL: (no result) % (ref 0–0)
PROT SERPL-MCNC: 7.1 G/DL (ref 6.5–8.1)
PROT UR QL STRIP.AUTO: NEGATIVE
RBC # BLD AUTO: 5.45 X10E6/UL (ref 4.3–5.7)
RBC CASTS #/AREA URNS LPF: (no result) /[LPF]
RBC MORPH BLD: (no result)
RBC UR QL: (no result)
RENAL EPI CELLS URNS QL MICRO: (no result)
ROULEAUX BLD QL SMEAR: (no result)
SCHISTOCYTES BLD QL SMEAR: (no result)
SICKLE CELLS BLD QL SMEAR: (no result)
SMUDGE CELLS BLD QL SMEAR: (no result)
SODIUM SERPL-SCNC: 137 MMOL/L (ref 136–145)
SP GR UR STRIP: 1.02 (ref 1.01–1.02)
SPERM URNS QL MICRO: (no result)
SPHEROCYTES BLD QL SMEAR: (no result)
STOMATOCYTES BLD QL SMEAR: (no result)
T VAGINALIS URNS QL MICRO: (no result)
TARGETS BLD QL SMEAR: (no result)
TOXIC GRANULES BLD QL SMEAR: (no result)
TRI-PHOS CRY URNS QL MICRO: (no result)
TYROSINE CRY URNS QL MICRO: (no result)
URATE CRY URNS QL MICRO: (no result)
UROBILINOGEN UR STRIP-MCNC: 0.2 MG/DL (ref 0.2–1)
VARIANT LYMPHS NFR BLD: (no result) %
WAXY CASTS URNS QL MICRO: (no result)
WBC # BLD: 16.21 X10E3/UL (ref 4.8–10.8)
WBC #/AREA URNS HPF: (no result) /HPF (ref 0–5)
WBC CASTS URNS QL MICRO: (no result)
WBC NRBC COR # BLD: (no result) 10*3/UL
YEAST URNS QL MICRO: (no result)

## 2025-07-22 PROCEDURE — 36415 COLL VENOUS BLD VENIPUNCTURE: CPT

## 2025-07-22 PROCEDURE — 74177 CT ABD & PELVIS W/CONTRAST: CPT

## 2025-07-22 PROCEDURE — 80053 COMPREHEN METABOLIC PANEL: CPT

## 2025-07-22 PROCEDURE — 93005 ELECTROCARDIOGRAM TRACING: CPT

## 2025-07-22 PROCEDURE — 82550 ASSAY OF CK (CPK): CPT

## 2025-07-22 PROCEDURE — 85025 COMPLETE CBC W/AUTO DIFF WBC: CPT

## 2025-07-22 PROCEDURE — 83690 ASSAY OF LIPASE: CPT

## 2025-07-22 PROCEDURE — 81001 URINALYSIS AUTO W/SCOPE: CPT

## 2025-07-22 PROCEDURE — 84484 ASSAY OF TROPONIN QUANT: CPT

## 2025-07-22 PROCEDURE — 99284 EMERGENCY DEPT VISIT MOD MDM: CPT

## 2025-07-22 RX ADMIN — Medication STA MG: at 23:47

## 2025-07-22 RX ADMIN — SODIUM CHLORIDE STA MLS/HR: 9 INJECTION, SOLUTION INTRAVENOUS at 23:47

## 2025-07-22 RX ADMIN — SODIUM CHLORIDE STA MG: 900 INJECTION INTRAVENOUS at 23:47

## 2025-07-23 VITALS — OXYGEN SATURATION: 98 % | HEART RATE: 64 BPM

## 2025-07-23 LAB — TROPONIN I SERPL DL<=0.01 NG/ML-MCNC: < 0.001 NG/ML (ref 0–0.3)
